# Patient Record
Sex: MALE | URBAN - METROPOLITAN AREA
[De-identification: names, ages, dates, MRNs, and addresses within clinical notes are randomized per-mention and may not be internally consistent; named-entity substitution may affect disease eponyms.]

---

## 2024-10-07 ENCOUNTER — TELEPHONE (OUTPATIENT)
Age: 39
End: 2024-10-07

## 2024-10-07 NOTE — TELEPHONE ENCOUNTER
Pt called in to start the process of being seen by Dr. Priest. Pt had back surgery in the past at [facility] done by Dr. George and would like to establish with him at [facility].   Pt is going to reach out to [facility] to have all records from [facility] and PCP faxed in to the office.   Pt is having similar back pain as before.   I explained our intake process. Pt was thankful for the information and will call in to do intake to then be seen by an AP as no recent imaging has been done.

## 2024-10-28 ENCOUNTER — TELEPHONE (OUTPATIENT)
Dept: NEUROSURGERY | Facility: CLINIC | Age: 39
End: 2024-10-28

## 2024-10-28 NOTE — TELEPHONE ENCOUNTER
Received 1 cd by certified mail today  from [facility] all the disc reads is 24 studies exam dates 08/8/2014- 7/21/2020 I see pt would be new and saw documentation from EP that he is new sent her message that disc was received gave to linn to upload  -_KIA       10/29/24 - hi received about 100 pages of records  double sided also for this pt in mail today records are from [facility] scanning to [facility] now EP advised in secure chat _- 190 pages scanned in unable to send all at once had to send in two batches to [facility] - EP was advised BA

## 2024-11-14 ENCOUNTER — OFFICE VISIT (OUTPATIENT)
Age: 39
End: 2024-11-14
Payer: COMMERCIAL

## 2024-11-14 ENCOUNTER — HOSPITAL ENCOUNTER (OUTPATIENT)
Dept: RADIOLOGY | Facility: HOSPITAL | Age: 39
End: 2024-11-14
Payer: COMMERCIAL

## 2024-11-14 VITALS
WEIGHT: 168 LBS | RESPIRATION RATE: 20 BRPM | HEIGHT: 71 IN | OXYGEN SATURATION: 94 % | TEMPERATURE: 98.2 F | HEART RATE: 82 BPM | BODY MASS INDEX: 23.52 KG/M2 | DIASTOLIC BLOOD PRESSURE: 70 MMHG | SYSTOLIC BLOOD PRESSURE: 108 MMHG

## 2024-11-14 DIAGNOSIS — M54.16 LUMBAR RADICULOPATHY: Primary | ICD-10-CM

## 2024-11-14 DIAGNOSIS — M54.42 ACUTE MIDLINE LOW BACK PAIN WITH BILATERAL SCIATICA: ICD-10-CM

## 2024-11-14 DIAGNOSIS — M54.16 LUMBAR RADICULOPATHY: ICD-10-CM

## 2024-11-14 DIAGNOSIS — M54.41 ACUTE MIDLINE LOW BACK PAIN WITH BILATERAL SCIATICA: ICD-10-CM

## 2024-11-14 DIAGNOSIS — M51.26 OTHER INTERVERTEBRAL DISC DISPLACEMENT, LUMBAR REGION: ICD-10-CM

## 2024-11-14 PROCEDURE — 99203 OFFICE O/P NEW LOW 30 MIN: CPT | Performed by: PHYSICIAN ASSISTANT

## 2024-11-14 PROCEDURE — 72114 X-RAY EXAM L-S SPINE BENDING: CPT

## 2024-11-14 NOTE — PROGRESS NOTES
Name: Wellington Hollis      : 1985      MRN: [id number]  Encounter Provider: Beverly Flanagan PA-C  Encounter Date: 2024   Encounter department: [facility]  :  Assessment & Plan  Lumbar radiculopathy  Patient presents today as a new patient for evaluation of low back pain and posterior leg pain.  Patient endorses mid/low back pain with radiation into gluteal and posterior lateral thigh with associated tingling.  At times, feels as though legs are going to give out.   Activities limited 2/2 pain    Imaging:   No updated imaging   MRI lumbar spine 2018:   1. Small broad-based posterior central disc protrusion at L4-5 with mild narrowing of both lateral recesses.   2. Small broad-based posterior central disc protrusion at L2-3 with minimal central canal narrowing.     Plan:   Continue to monitor neurological symptoms  Discussed options for conservative management including medications, physical therapy and pain management.  Referral placed for formal physical therapy.  In the interim patient to continue his home stretching regimen.  Referral placed for pain management for further discussion of injections.  Ordered flexion/extension Xrays given patient endorsing feeling of shift in his back with movement.   MRI spine imaging ordered to evaluate any progression of known lumbar degenerative changes.  Patient wishes to follow-up with Dr. Priest after completion of MRI imaging and trial of conservative management.    Of note. Dr. Priest completed prior ACDF  and 2018 at [facility].     Orders:    MRI lumbar spine without contrast; Future    XR spine lumbar complete w bending minimum 6 views; Future    Ambulatory Referral to Physical Therapy; Future    Ambulatory referral to Sp[facility] Management; Future    Acute midline low back pain with bilateral sciatica    Orders:    MRI lumbar spine without contrast; Future    XR spine lumbar complete w bending minimum 6  "views; Future    Ambulatory Referral to Physical Therapy; Future    Ambulatory referral to Spine & Pain Management; Future    Other intervertebral disc displacement, lumbar region    Orders:    MRI lumbar spine without contrast; Future    XR spine lumbar complete w bending minimum 6 views; Future    Ambulatory Referral to Physical Therapy; Future    Ambulatory referral to Spine & Pain Management; Future        History of Present Illness   This is a 38 y/o male with PMH ACDF C5/6, C6/7 by Dr. Priest 2016 and 2018 who presents today as a new patient who presents today for evaluation of low back pain and radiculopathy.  Patient describes pain between the shoulder blades which is longstanding since his cervical surgery generally controlled with stretching and home exercise regimen as well as chiropractics.  Patient endorses mid to low back \"popping\" and a sensation of stretching and settling of vertebral bodies with position changes. He stopped going to the chiropractic since this sensation began.  He endorses low back pain with radiation to his posteriolateral bilateral gluteal and thighs.  There is associated tinging in the similar distribution which does not generally travel below the knee.  Increased symptoms with ambulation and while walking up steps he feels as though his legs are going to give out at times. This morning, he describes a cough causing a sharp pain and locking up his legs. He has trialed ice, heat, stretched, ibuprofen and marijuana (helps with pain and sleeping).     Patient endorses back problems in 2018 at which time Xrays and MRI was completed with mild degenerative changes.  He had LILIANE with resolution of his symptoms for a period of time.     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  Unfortunately, he has been let go as he has not be able to complete the job with his symptoms.         History obtained from: patient  Review of Systems   Constitutional: " "Negative.    HENT: Negative.     Eyes: Negative.    Respiratory: Negative.     Cardiovascular: Negative.    Gastrointestinal: Negative.    Endocrine: Negative.    Genitourinary: Negative.    Musculoskeletal:  Positive for back pain (upper back down to lowwr back down the legs).   Neurological:  Positive for weakness (in the legs giving out sometimes) and numbness (sometimes tingiling in the legs specially in the morning).   Psychiatric/Behavioral:  Negative for suicidal ideas.      Past Medical History   Past Medical History:   Diagnosis Date    Cervical disc disorder 2016    Degenerative Disc disease    Low back pain 2016    Pain    Lumbosacral disc disease 2018    Pain    Thoracic disc disorder 2018    Pain     History reviewed. No pertinent surgical history.  Family History   Problem Relation Age of Onset    Stroke Mother         Stroke which took her vidion in left eye. Possibly know cause, will discuss in person      reports that he has been smoking cigarettes. He started smoking about 20 years ago. He has a 30 pack-year smoking history. He has never used smokeless tobacco. He reports current alcohol use of about 3.0 standard drinks of alcohol per week. He reports current drug use. Frequency: 7.00 times per week. Drug: Marijuana.  No current outpatient medications on file prior to visit.     No current facility-administered medications on file prior to visit.   No Known Allergies        Objective   /70 (BP Location: Right arm, Patient Position: Sitting, Cuff Size: Large)   Pulse 82   Temp 98.2 °F (36.8 °C) (Temporal)   Resp 20   Ht 5' 11\" (1.803 m)   Wt 76.2 kg (168 lb)   SpO2 94%   BMI 23.43 kg/m²      Physical Exam  Constitutional:       General: He is not in acute distress.     Appearance: Normal appearance. He is well-developed. He is not ill-appearing.   HENT:      Head: Normocephalic and atraumatic.      Right Ear: External ear normal.      Left Ear: External ear normal.      Nose: Nose " normal.      Mouth/Throat:      Mouth: Mucous membranes are moist.   Eyes:      General: No scleral icterus.        Right eye: No discharge.         Left eye: No discharge.      Conjunctiva/sclera: Conjunctivae normal.   Cardiovascular:      Rate and Rhythm: Normal rate.   Pulmonary:      Effort: Pulmonary effort is normal. No respiratory distress.   Musculoskeletal:         General: Tenderness (diffuse thoracic and lumbar spine) present.      Cervical back: Normal range of motion and neck supple.   Skin:     General: Skin is warm and dry.   Neurological:      Mental Status: He is alert.   Psychiatric:         Mood and Affect: Mood normal.         Speech: Speech normal.         Behavior: Behavior normal.         Thought Content: Thought content normal.         Judgment: Judgment normal.       Neurologic Exam     Mental Status   Follows 3 step commands.   Attention: normal. Concentration: normal.   Speech: speech is normal   Level of consciousness: alert  Knowledge: good.   Normal comprehension.     Cranial Nerves     CN III, IV, VI   Conjugate gaze: present    CN VII   Facial expression full, symmetric.     CN VIII   Hearing: intact    Motor Exam   Muscle bulk: normal  Overall muscle tone: normal    Strength   Strength 5/5 except as noted. Mild HF weakness 4+ with some pain inhibition  +SLR     Sensory Exam   Light touch normal.     Gait, Coordination, and Reflexes     Tremor   Resting tremor: absent  Intention tremor: absent  Action tremor: absent    Reflexes   Right Kim: absent  Left Kim: absentSubtle 2 beat clonus b/l        Administrative Statements   I have spent a total time of 48 minutes in caring for this patient on the day of the visit/encounter including Risks and benefits of tx options, Instructions for management, Importance of tx compliance, Impressions, Documenting in the medical record, Reviewing / ordering tests, medicine, procedures  , and Obtaining or reviewing history  .

## 2024-11-19 ENCOUNTER — EVALUATION (OUTPATIENT)
Age: 39
End: 2024-11-19
Payer: COMMERCIAL

## 2024-11-19 ENCOUNTER — TELEPHONE (OUTPATIENT)
Dept: NEUROSURGERY | Facility: CLINIC | Age: 39
End: 2024-11-19

## 2024-11-19 DIAGNOSIS — M54.41 ACUTE MIDLINE LOW BACK PAIN WITH BILATERAL SCIATICA: Primary | ICD-10-CM

## 2024-11-19 DIAGNOSIS — M54.16 LUMBAR RADICULOPATHY: ICD-10-CM

## 2024-11-19 DIAGNOSIS — M51.26 OTHER INTERVERTEBRAL DISC DISPLACEMENT, LUMBAR REGION: ICD-10-CM

## 2024-11-19 DIAGNOSIS — M54.42 ACUTE MIDLINE LOW BACK PAIN WITH BILATERAL SCIATICA: Primary | ICD-10-CM

## 2024-11-19 PROCEDURE — 97110 THERAPEUTIC EXERCISES: CPT | Performed by: PHYSICAL THERAPIST

## 2024-11-19 NOTE — PROGRESS NOTES
PT Evaluation     Today's date: 2024  Patient name: Wellington Hollis  : 1985  MRN: [id number]  Referring provider: Beverly Flanagan PA-C  Dx:   Encounter Diagnosis     ICD-10-CM    1. Lumbar radiculopathy  M54.16 Ambulatory Referral to Physical Therapy      2. Acute midline low back pain with bilateral sciatica  M54.42 Ambulatory Referral to Physical Therapy    M54.41       3. Other intervertebral disc displacement, lumbar region  M51.26 Ambulatory Referral to Physical Therapy                     Assessment  Impairments: abnormal coordination, abnormal muscle firing, abnormal or restricted ROM, abnormal movement, activity intolerance, impaired balance, impaired physical strength, lacks appropriate home exercise program, pain with function, poor posture  and poor body mechanics  Functional limitations: walking, standing, lifting,  Symptom irritability: moderate    Assessment details: Wellington Hollis is a 39 y.o. male presenting with chronic LBP with underlying centrally mediated pain. Primary impairments include decreased ROM, weakness, pain, endruance, postural tolerance . Will benefit from skilled PT interventions for Return to PLOF. HEP was provided and reviewed. Patient is able to complete HEP with good technique and appropriate pain response. Patient expressed understanding of appropriate dosage and frequency of HEP. No additional referral necessary.     Understanding of Dx/Px/POC: good     Prognosis: good    Goals    Short Term Goals:  In 4 weeks, the patient will:  1. Full pain free lumbar flexion  2. Standing toelracne to >60mins  3. Supervision with HEP for self care    Long Term Goals:  In 8 weeks, the patient will:  1. SLR to >75  2. FOTO to greater than predicted value  3. Independent with HEP for selfcare    Plan  Patient would benefit from: skilled physical therapy    Planned therapy interventions: joint mobilization, manual therapy, massage, Peguero taping, neuromuscular re-education,  patient education, postural training, self care, strengthening, stretching, therapeutic activities, therapeutic exercise, therapeutic training, graded exercise, graded activity, home exercise program, flexibility, functional ROM exercises, balance and activity modification    Frequency: 2x week  Duration in weeks: 8  Treatment plan discussed with: patient    Subjective  Pain Location: C/S Fusion C6-7, current c/o lumbar pain without radicular s/s,   Pain Intensity: 8/10  MARTHA: chronic, worse in last month,   Provoked by: prolonged, bending, lifting, prolonged walking, time dependent,   Eased Positions: positional changes  Constitutional S/S: denies  Goals: return to PLOF,   PLOF: independent activity,     Objective    Red Flags:None    Postural Findings:     Head Position x Protracted  Neutral  Retracted   Scapular Position x Protracted  Neutral  Retracted   Thoracic Spine x Inc Kyphosis  Neutral     Lumbar Spine  Inc Lordosis  Neutral x Dec Lordosis   Pelvis  Anterior Tilt  Neutral x Posterior Tilt   Iliac Crest  L elevated x Neutral  R elevated   Feet  Pronated x Neutral  Supinated   Lateral Shift  Right  Left x None     Strength and ROM evaluated B from a regional biomechanical perspective and values relevant to this episode recorded in tables below.    ROM:   Joint / Motion  Right  Left    Lumbar Flexion  60p     Lumbar Extension  0     Lumbar Sidebending  50% 50%   Lumbar rotation 50% 50%   Hip ER  WNL WNL   Hip IR  WNL WNL     Repeated Movements: Not indicated    NEURO Screen  Reflexes  Right Left   Biceps (C5-C6) 2+ 2+   Brachioradialis (C5-C6) 2+ 2+   Triceps (C7-C8) 2+ 2+   Patellar (L3-L4) 2+ 2+   Achilles (S1-S2) 2+ 2+   Kim's n n   Clonus n n     LE Myotomes:  Nerve root Test Action RIGHT  LEFT   L1-L2 Hip Flexion 5 5   L3 Knee Extension 5 5   L4  Ankle DF 5 5   L5 Great toe Extension 5 5   S1 Ankle PF, ankle eversion, hip extension, knee flexion 5   5     S2 Knee Flexion 5 5     Dermatomes:  intact    Additional Assessments:  Pain with palpation noted: generalized hyperalgesia, TTP paraspinales L5-S1  Joint Mobility: guarded, pain with Pas L4-S1    Special Tests:  Lumbar Specific and Neural Tension                                                                            Test / Measure  Right 11/19/2024 Left 11/19/2024   Straight Leg raise 50 60   Crossed straight leg raise n n   Slump test p p   Prone instability test n n     SI Joint Screen: No Presence of Inominate asymmetry, (P) March Test, TTP posterior SIJ ligaments         Precautions: C3-7 fusion (2018),     Visit/Unit Tracking  AUTH Status:  Date 11/20     pending Used 1      Remaining          Pertinent Findings:      POC End Date: 1/19/25                                                                                          Test / Measure  11/20   FOTO (Predicted 67) 53   Lumbar flexion ROM 60p   Core mmt 3/5         Treatment Based Classification: Primary Mobility Secondary CBT    Manuals 11/20                   Neuro Re-Ed                                Ther Ex    HEP reveiw 8'                               Ther Activity            Gait Training            Modalities

## 2024-11-20 PROCEDURE — 97162 PT EVAL MOD COMPLEX 30 MIN: CPT | Performed by: PHYSICAL THERAPIST

## 2024-12-03 ENCOUNTER — OFFICE VISIT (OUTPATIENT)
Age: 39
End: 2024-12-03
Payer: COMMERCIAL

## 2024-12-03 DIAGNOSIS — M54.16 LUMBAR RADICULOPATHY: Primary | ICD-10-CM

## 2024-12-03 DIAGNOSIS — M51.26 OTHER INTERVERTEBRAL DISC DISPLACEMENT, LUMBAR REGION: ICD-10-CM

## 2024-12-03 DIAGNOSIS — M54.41 ACUTE MIDLINE LOW BACK PAIN WITH BILATERAL SCIATICA: ICD-10-CM

## 2024-12-03 DIAGNOSIS — M54.42 ACUTE MIDLINE LOW BACK PAIN WITH BILATERAL SCIATICA: ICD-10-CM

## 2024-12-03 PROCEDURE — 97112 NEUROMUSCULAR REEDUCATION: CPT | Performed by: PHYSICAL THERAPIST

## 2024-12-03 PROCEDURE — 97110 THERAPEUTIC EXERCISES: CPT | Performed by: PHYSICAL THERAPIST

## 2024-12-03 NOTE — PROGRESS NOTES
Daily Note     Today's date: 12/3/2024  Patient name: Wellington Hollis  : 1985  MRN: [id number]  Referring provider: Beverly Flanagan PA-C  Dx:   Encounter Diagnosis     ICD-10-CM    1. Lumbar radiculopathy  M54.16       2. Acute midline low back pain with bilateral sciatica  M54.42     M54.41       3. Other intervertebral disc displacement, lumbar region  M51.26           Start Time: 0950  Stop Time: 1030  Total time in clinic (min): 40 minutes    Subjective: Minimal change in status due to travels for family issues.     Objective: See treatment diary below    Assessment: Tolerated treatment well. Patient demonstrated fatigue post treatment, exhibited good technique with therapeutic exercises, and would benefit from continued PT 1:1 with Jimy Mcnair DPT for entirety of tx. Improve ROM and decreased guarding post HVLA. Continue per POC.     Plan: Continue per plan of care.      Precautions: C3-7 fusion (),     Visit/Unit Tracking  AUTH Status:  Date 11/20 12/3    pending Used 1 2     Remaining          Pertinent Findings:      POC End Date: 25                                                                                          Test / Measure     FOTO (Predicted 67) 53   Lumbar flexion ROM 60p   Core mmt 3/5         Treatment Based Classification: Primary Mobility Secondary CBT    Manuals 11/20 12/3   Neutral Gap G5  L5-S1 (B) dec s/s                    Neuro Re-Ed     LTRs  2x30   Sesar     Bridges  2x20   Ball DKTC  x30   Seated [place] ROT  3# x20 ea   Seated herbert RDL  2x15 5#        Ther Ex     HEP reveiw 8'    NS   8' L1                                 Ther Activity               Gait Training               Modalities

## 2024-12-06 ENCOUNTER — OFFICE VISIT (OUTPATIENT)
Age: 39
End: 2024-12-06
Payer: COMMERCIAL

## 2024-12-06 DIAGNOSIS — M54.41 ACUTE MIDLINE LOW BACK PAIN WITH BILATERAL SCIATICA: ICD-10-CM

## 2024-12-06 DIAGNOSIS — M54.42 ACUTE MIDLINE LOW BACK PAIN WITH BILATERAL SCIATICA: ICD-10-CM

## 2024-12-06 DIAGNOSIS — M51.26 OTHER INTERVERTEBRAL DISC DISPLACEMENT, LUMBAR REGION: ICD-10-CM

## 2024-12-06 DIAGNOSIS — M54.16 LUMBAR RADICULOPATHY: Primary | ICD-10-CM

## 2024-12-06 PROCEDURE — 97110 THERAPEUTIC EXERCISES: CPT

## 2024-12-06 PROCEDURE — 97112 NEUROMUSCULAR REEDUCATION: CPT

## 2024-12-06 NOTE — PROGRESS NOTES
Daily Note     Today's date: 2024  Patient name: Wellington Hollis  : 1985  MRN: [id number]  Referring provider: Beverly Flanagan PA-C  Dx:   Encounter Diagnosis     ICD-10-CM    1. Lumbar radiculopathy  M54.16       2. Acute midline low back pain with bilateral sciatica  M54.42     M54.41       3. Other intervertebral disc displacement, lumbar region  M51.26                      Subjective: Pt reports that he is feeling sore today in his low back. Notes that he has been doing his HEP and the prone back bends cause increased pain in his low back.     Objective: See treatment diary below    Assessment: Tolerated treatment well. Patient demonstrated fatigue post treatment, exhibited good technique with therapeutic exercises, and would benefit from continued PT 1:1 with Prosper Borjas PTA for entirety of tx. Improve ROM and decreased guarding post HVLA. Continue per POC.     Plan: Continue per plan of care.      Precautions: C3-7 fusion (2018),     Visit/Unit Tracking  AUTH Status:  Date 11/20 12/3 12/6   pending Used 1 2 3    Remaining          Pertinent Findings:      POC End Date: 25                                                                                          Test / Measure     FOTO (Predicted 67) 53   Lumbar flexion ROM 60p   Core mmt 3/5         Treatment Based Classification: Primary Mobility Secondary CBT    Manuals 11/20 12/3 12/6   Neutral Gap G5  L5-S1 (B) dec s/s                        Neuro Re-Ed      LTRs  2x30 2x30   Sesar   Propped 4'   Bridges  2x20 2x20   Ball DKTC  x30 x30   Seated [place] ROT  3# x20 ea 3# x20 ea   Seated herbert RDL  2x15 5# 2x15 5#         Ther Ex      HEP reveiw 8'     NS   8' L1 8' L1                                       Ther Activity                  Gait Training                  Modalities

## 2024-12-10 ENCOUNTER — OFFICE VISIT (OUTPATIENT)
Age: 39
End: 2024-12-10
Payer: COMMERCIAL

## 2024-12-10 DIAGNOSIS — M51.26 OTHER INTERVERTEBRAL DISC DISPLACEMENT, LUMBAR REGION: ICD-10-CM

## 2024-12-10 DIAGNOSIS — M54.41 ACUTE MIDLINE LOW BACK PAIN WITH BILATERAL SCIATICA: ICD-10-CM

## 2024-12-10 DIAGNOSIS — M54.42 ACUTE MIDLINE LOW BACK PAIN WITH BILATERAL SCIATICA: ICD-10-CM

## 2024-12-10 DIAGNOSIS — M54.16 LUMBAR RADICULOPATHY: Primary | ICD-10-CM

## 2024-12-10 PROCEDURE — 97110 THERAPEUTIC EXERCISES: CPT

## 2024-12-10 PROCEDURE — 97112 NEUROMUSCULAR REEDUCATION: CPT

## 2024-12-10 NOTE — PROGRESS NOTES
Daily Note     Today's date: 12/10/2024  Patient name: Wellington Hollis  : 1985  MRN: [id number]  Referring provider: Beverly Flanagan PA-C  Dx:   Encounter Diagnosis     ICD-10-CM    1. Lumbar radiculopathy  M54.16       2. Acute midline low back pain with bilateral sciatica  M54.42     M54.41       3. Other intervertebral disc displacement, lumbar region  M51.26                      Subjective: Pt reports he is doing well overall, but he is a little more stiff today.       Objective: See treatment diary below      Assessment: Tolerated treatment well. Pt performed all exercises without issue, continue to progress to tolerance. Pt showed good form with exercises throughout session, minimal cueing required. Small # progression with jimmie exercises without issue. Patient demonstrated fatigue post treatment, exhibited good technique with therapeutic exercises, and would benefit from continued PT      Plan: Continue per plan of care.      Precautions: C3-7 fusion (2018),     Visit/Unit Tracking  AUTH Status:  Date 11/20 12/3 12/6 12/10   pending Used 1 2 3 4    Remaining           Pertinent Findings:      POC End Date: 25                                                                                          Test / Measure     FOTO (Predicted 67) 53   Lumbar flexion ROM 60p   Core mmt 3/5         Treatment Based Classification: Primary Mobility Secondary CBT    Manuals 11/20 12/3 12/6 12/10   Neutral Gap G5  L5-S1 (B) dec s/s                            Neuro Re-Ed       LTRs  2x30 2x30 2x30   Sesar   Propped 4' Propped 4'   Bridges  2x20 2x20 2x20   Ball DKTC  x30 x30 x30   Seated Jimmie ROT  3# x20 ea 3# x20 ea 4# x20 ea   Seated jimmie RDL  2x15 5# 2x15 5# 2x15 6.5#          Ther Ex       HEP reveiw 8'      NS   8' L1 8' L1 8' L1                                             Ther Activity                     Gait Training                     Modalities

## 2024-12-12 ENCOUNTER — OFFICE VISIT (OUTPATIENT)
Age: 39
End: 2024-12-12
Payer: COMMERCIAL

## 2024-12-12 DIAGNOSIS — M54.16 LUMBAR RADICULOPATHY: Primary | ICD-10-CM

## 2024-12-12 DIAGNOSIS — M54.41 ACUTE MIDLINE LOW BACK PAIN WITH BILATERAL SCIATICA: ICD-10-CM

## 2024-12-12 DIAGNOSIS — M54.42 ACUTE MIDLINE LOW BACK PAIN WITH BILATERAL SCIATICA: ICD-10-CM

## 2024-12-12 PROCEDURE — 97140 MANUAL THERAPY 1/> REGIONS: CPT | Performed by: PHYSICAL THERAPIST

## 2024-12-12 PROCEDURE — 97110 THERAPEUTIC EXERCISES: CPT | Performed by: PHYSICAL THERAPIST

## 2024-12-12 PROCEDURE — 97112 NEUROMUSCULAR REEDUCATION: CPT | Performed by: PHYSICAL THERAPIST

## 2024-12-12 NOTE — PROGRESS NOTES
Daily Note     Today's date: 2024  Patient name: Wellington Hollis  : 1985  MRN: [id number]  Referring provider: Beverly Flanagan PA-C  Dx:   Encounter Diagnosis     ICD-10-CM    1. Lumbar radiculopathy  M54.16       2. Acute midline low back pain with bilateral sciatica  M54.42     M54.41           Start Time: 0840  Stop Time: 0935  Total time in clinic (min): 55 minutes    Subjective: Improved mobility still having pain.     Objective: See treatment diary below    Assessment: Tolerated treatment well. Patient demonstrated fatigue post treatment, exhibited good technique with therapeutic exercises, and would benefit from continued PT 1:1 with Jimy Mcnair DPT for entirety of tx. Pending MRI, improved activity tolerance. Continue per POC    Plan: Continue per plan of care.      Precautions: C3-7 fusion (),     Visit/Unit Tracking  AUTH Status:  Date 11/20 12/3 12/6 12/10 12/12   pending Used 1 2 3 4 5F    Remaining            Pertinent Findings:      POC End Date: 25                                                                                          Test / Measure     FOTO (Predicted 67) 53   Lumbar flexion ROM 60p   Core mmt 3/5         Treatment Based Classification: Primary Mobility Secondary CBT    Manuals 11/20 12/3 12/6 12/10 12/12   Neutral Gap G5  L5-S1 (B) dec s/s   L5-S1 (B) dec s/s KS                             Neuro Re-Ed        LTRs  2x30 2x30 2x30 2x30   Sesar   Propped 4' Propped 4' Propped 4'   Bridges  2x20 2x20 2x20 2x20 10#   Ball DKTC  x30 x30 x30 x30   Seated [place] ROT  3# x20 ea 3# x20 ea 4# x20 ea 4# x20 ea   Seated herbert RDL  2x15 5# 2x15 5# 2x15 6.5# 2x15 6.5#           Ther Ex        HEP reveiw 8'       NS   8' L1 8' L1 8' L1 8' L1                                                   Ther Activity                        Gait Training                        Modalities

## 2024-12-17 ENCOUNTER — OFFICE VISIT (OUTPATIENT)
Age: 39
End: 2024-12-17
Payer: COMMERCIAL

## 2024-12-17 DIAGNOSIS — M54.16 LUMBAR RADICULOPATHY: Primary | ICD-10-CM

## 2024-12-17 DIAGNOSIS — M54.42 ACUTE MIDLINE LOW BACK PAIN WITH BILATERAL SCIATICA: ICD-10-CM

## 2024-12-17 DIAGNOSIS — M51.26 OTHER INTERVERTEBRAL DISC DISPLACEMENT, LUMBAR REGION: ICD-10-CM

## 2024-12-17 DIAGNOSIS — M54.41 ACUTE MIDLINE LOW BACK PAIN WITH BILATERAL SCIATICA: ICD-10-CM

## 2024-12-17 PROCEDURE — 97110 THERAPEUTIC EXERCISES: CPT | Performed by: PHYSICAL THERAPIST

## 2024-12-17 PROCEDURE — 97140 MANUAL THERAPY 1/> REGIONS: CPT | Performed by: PHYSICAL THERAPIST

## 2024-12-17 PROCEDURE — 97112 NEUROMUSCULAR REEDUCATION: CPT | Performed by: PHYSICAL THERAPIST

## 2024-12-17 NOTE — PROGRESS NOTES
Daily Note     Today's date: 2024  Patient name: Wellington Hollis  : 1985  MRN: [id number]  Referring provider: Beverly Flanagan PA-C  Dx:   Encounter Diagnosis     ICD-10-CM    1. Lumbar radiculopathy  M54.16       2. Other intervertebral disc displacement, lumbar region  M51.26       3. Acute midline low back pain with bilateral sciatica  M54.42     M54.41           Start Time: 0835  Stop Time: 0930  Total time in clinic (min): 55 minutes    Subjective: Increased pain over the weekend.     Objective: See treatment diary below    Assessment: Tolerated treatment well. Patient demonstrated fatigue post treatment, exhibited good technique with therapeutic exercises, and would benefit from continued PT 1:1 with Jimy Mcnair DPT for entirety of tx.     Plan: Continue per plan of care.      Precautions: C3-7 fusion (),     Visit/Unit Tracking  AUTH Status:  Date 11/20 12/3 12/6 12/10 12/12 12/17   pending Used 1 2 3 4 5F 6    Remaining             Pertinent Findings:      POC End Date: 25                                                                                          Test / Measure     FOTO (Predicted 67) 53   Lumbar flexion ROM 60p   Core mmt 3/5         Treatment Based Classification: Primary Mobility Secondary CBT    Manuals 11/20 12/3 12/6 12/10 12/12 12/17   Neutral Gap G5  L5-S1 (B) dec s/s   L5-S1 (B) dec s/s [place] L5-S1 (B) dec s/s [place]                                Neuro Re-Ed         LTRs  2x30 2x30 2x30 2x30 2x30   Sesar   Propped 4' Propped 4' Propped 4' Propped 4'   Bridges  2x20 2x20 2x20 2x20 10# 2x20 20#   Ball DKTC  x30 x30 x30 x30 x30   Seated [place] ROT  3# x20 ea 3# x20 ea 4# x20 ea 4# x20 ea 4# x20 ea   Seated herbert RDL  2x15 5# 2x15 5# 2x15 6.5# 2x15 6.5# 2x15 6.5#   [place] Press      X15 ea blk band   Front Carry      3x20ft 20#            Ther Ex         HEP reveiw 8'        NS   8' L1 8' L1 8' L1 8' L1 8' L1                                                          Ther Activity                           Gait Training                           Modalities

## 2024-12-19 ENCOUNTER — OFFICE VISIT (OUTPATIENT)
Age: 39
End: 2024-12-19
Payer: COMMERCIAL

## 2024-12-19 DIAGNOSIS — M54.42 ACUTE MIDLINE LOW BACK PAIN WITH BILATERAL SCIATICA: ICD-10-CM

## 2024-12-19 DIAGNOSIS — M51.26 OTHER INTERVERTEBRAL DISC DISPLACEMENT, LUMBAR REGION: ICD-10-CM

## 2024-12-19 DIAGNOSIS — M54.16 LUMBAR RADICULOPATHY: Primary | ICD-10-CM

## 2024-12-19 DIAGNOSIS — M54.41 ACUTE MIDLINE LOW BACK PAIN WITH BILATERAL SCIATICA: ICD-10-CM

## 2024-12-19 PROCEDURE — 97140 MANUAL THERAPY 1/> REGIONS: CPT | Performed by: PHYSICAL THERAPIST

## 2024-12-19 PROCEDURE — 97110 THERAPEUTIC EXERCISES: CPT | Performed by: PHYSICAL THERAPIST

## 2024-12-19 NOTE — PROGRESS NOTES
Daily Note     Today's date: 2024  Patient name: Wellington Hollis  : 1985  MRN: [id number]  Referring provider: Beverly Flanagan PA-C  Dx:   Encounter Diagnosis     ICD-10-CM    1. Lumbar radiculopathy  M54.16       2. Acute midline low back pain with bilateral sciatica  M54.42     M54.41       3. Other intervertebral disc displacement, lumbar region  M51.26           Start Time: 0830  Stop Time: 0915  Total time in clinic (min): 45 minutes    Subjective: Pt reports feeling much better today compared to last visit post G5.     Objective: See treatment diary below    Assessment: Tolerated treatment well. Patient demonstrated fatigue post treatment, exhibited good technique with therapeutic exercises, and would benefit from continued PT 1:1 with Jimy Mcnair DPT for 30 mins of tx.     Plan: Continue per plan of care.      Precautions: C3-7 fusion (),     Visit/Unit Tracking  AUTH Status:  Date 11/20 12/3 12/6 12/10 12/12 12/17 12/9   pending Used 1 2 3 4 5F 6 7    Remaining              Pertinent Findings:      POC End Date: 25                                                                                          Test / Measure     FOTO (Predicted 67) 53   Lumbar flexion ROM 60p   Core mmt 3/5         Treatment Based Classification: Primary Mobility Secondary CBT    Manuals 11/20 12/3 12/6 12/10 12/12 12/17 12/19   Neutral Gap G5  L5-S1 (B) dec s/s   L5-S1 (B) dec s/s KS L5-S1 (B) dec s/s KS L5-S1 (B) dec s/s KS                                   Neuro Re-Ed          LTRs  2x30 2x30 2x30 2x30 2x30 2x30   Sesar   Propped 4' Propped 4' Propped 4' Propped 4' Propped 4'   Bridges  2x20 2x20 2x20 2x20 10# 2x20 20# 2x20 20#   Ball DKTC  x30 x30 x30 x30 x30 x30   Seated [place] ROT  3# x20 ea 3# x20 ea 4# x20 ea 4# x20 ea 4# x20 ea 4# x20 ea   Seated her[place] RDL  2x15 5# 2x15 5# 2x15 6.5# 2x15 6.5# 2x15 6.5# 2x15 6.5#   Paloff Press      X15 ea blk band    Front Carry      3x20ft 20# 4x20ft 20#              Ther Ex          HEP reveiw 8'         NS   8' L1 8' L1 8' L1 8' L1 8' L1 8'L1                                                               Ther Activity                              Gait Training                              Modalities

## 2024-12-20 ENCOUNTER — HOSPITAL ENCOUNTER (OUTPATIENT)
Dept: RADIOLOGY | Age: 39
Discharge: HOME | End: 2024-12-20
Payer: COMMERCIAL

## 2024-12-20 DIAGNOSIS — M54.16 LUMBAR RADICULOPATHY: ICD-10-CM

## 2024-12-20 DIAGNOSIS — M54.42 ACUTE MIDLINE LOW BACK PAIN WITH BILATERAL SCIATICA: ICD-10-CM

## 2024-12-20 DIAGNOSIS — M54.41 ACUTE MIDLINE LOW BACK PAIN WITH BILATERAL SCIATICA: ICD-10-CM

## 2024-12-20 DIAGNOSIS — M51.26 OTHER INTERVERTEBRAL DISC DISPLACEMENT, LUMBAR REGION: ICD-10-CM

## 2024-12-20 PROCEDURE — 72148 MRI LUMBAR SPINE W/O DYE: CPT

## 2024-12-23 ENCOUNTER — OFFICE VISIT (OUTPATIENT)
Age: 39
End: 2024-12-23
Payer: COMMERCIAL

## 2024-12-23 DIAGNOSIS — M54.41 ACUTE MIDLINE LOW BACK PAIN WITH BILATERAL SCIATICA: ICD-10-CM

## 2024-12-23 DIAGNOSIS — M54.42 ACUTE MIDLINE LOW BACK PAIN WITH BILATERAL SCIATICA: ICD-10-CM

## 2024-12-23 DIAGNOSIS — M54.16 LUMBAR RADICULOPATHY: Primary | ICD-10-CM

## 2024-12-23 DIAGNOSIS — M51.26 OTHER INTERVERTEBRAL DISC DISPLACEMENT, LUMBAR REGION: ICD-10-CM

## 2024-12-23 PROCEDURE — 97110 THERAPEUTIC EXERCISES: CPT

## 2024-12-23 PROCEDURE — 97112 NEUROMUSCULAR REEDUCATION: CPT

## 2024-12-23 NOTE — PROGRESS NOTES
Daily Note     Today's date: 2024  Patient name: Wellington Hollis  : 1985  MRN: [id number]  Referring provider: Beverly Flanagan PA-C  Dx:   Encounter Diagnosis     ICD-10-CM    1. Lumbar radiculopathy  M54.16       2. Acute midline low back pain with bilateral sciatica  M54.42     M54.41       3. Other intervertebral disc displacement, lumbar region  M51.26             Start Time: 0900  Stop Time: 0945  Total time in clinic (min): 45 minutes    Subjective: Pt reports his pain levels are about the same, slight improvement; however it feels better after movement. He has been complaint with his HEP. Reports will see his neuro about MRI results end Dec.     Objective: See treatment diary below    Assessment: Tolerated treatment well. Patient performed NuStep to increase blood flow to the area being treated, prepare the muscle for strength training and lengthening and to improve overall tolerance to activity  Added femoral n glides in prone and PPU per PT POC to address impairments with good tolerance. Pt c/o of some pain/tolerable during PPU and pain during femoral glide; however faired better with decrease range.  Patient demonstrated fatigue post treatment, exhibited good technique with therapeutic exercises, and would benefit from continued PT     Plan: Continue per plan of care.      Precautions: C3-7 fusion (2018),     Visit/Unit Tracking  AUTH Status:  Date 11/20 12/3 12/6 12/10 12/12 12/17 12/9 12/23   pending Used 1 2 3 4 5F 6 7 8    Remaining               Pertinent Findings:      POC End Date: 25                                                                                          Test / Measure     FOTO (Predicted 67) 53   Lumbar flexion ROM 60p   Core mmt 3/5         Treatment Based Classification: Primary Mobility Secondary CBT    Manuals 11/20 12/3 12/6 12/10 12/12 12/17 12/19 12/23   Neutral Gap G5  L5-S1 (B) dec s/s   L5-S1 (B) dec s/s KS L5-S1 (B) dec s/s KS L5-S1 (B) dec s/s  KS                                       Neuro Re-Ed           LTRs  2x30 2x30 2x30 2x30 2x30 2x30 2x30   Sesar   Propped 4' Propped 4' Propped 4' Propped 4' Propped 4' 2x10       Bridges  2x20 2x20 2x20 2x20 10# 2x20 20# 2x20 20# 2x20 20#   Ball DKTC  x30 x30 x30 x30 x30 x30 x30   Seated [place] ROT  3# x20 ea 3# x20 ea 4# x20 ea 4# x20 ea 4# x20 ea 4# x20 ea 4# x20 ea   Seated jimmie RDL  2x15 5# 2x15 5# 2x15 6.5# 2x15 6.5# 2x15 6.5# 2x15 6.5# 2x15 6.5#   Paloff Press      X15 ea blk band     Front Carry      3x20ft 20# 4x20ft 20# 4x20ft 20#   Femoral nerve glide        Prone  W strap    x15       Ther Ex           HEP reveiw 8'          NS   8' L1 8' L1 8' L1 8' L1 8' L1 8'L1 8' L1                                                                     Ther Activity                                 Gait Training                                 Modalities

## 2024-12-24 ENCOUNTER — RESULTS FOLLOW-UP (OUTPATIENT)
Dept: OTHER | Facility: HOSPITAL | Age: 39
End: 2024-12-24

## 2024-12-26 ENCOUNTER — OFFICE VISIT (OUTPATIENT)
Age: 39
End: 2024-12-26
Payer: COMMERCIAL

## 2024-12-26 DIAGNOSIS — M51.26 OTHER INTERVERTEBRAL DISC DISPLACEMENT, LUMBAR REGION: ICD-10-CM

## 2024-12-26 DIAGNOSIS — M54.16 LUMBAR RADICULOPATHY: Primary | ICD-10-CM

## 2024-12-26 DIAGNOSIS — M54.41 ACUTE MIDLINE LOW BACK PAIN WITH BILATERAL SCIATICA: ICD-10-CM

## 2024-12-26 DIAGNOSIS — M54.42 ACUTE MIDLINE LOW BACK PAIN WITH BILATERAL SCIATICA: ICD-10-CM

## 2024-12-26 PROCEDURE — 97112 NEUROMUSCULAR REEDUCATION: CPT | Performed by: PHYSICAL THERAPIST

## 2024-12-26 PROCEDURE — 97110 THERAPEUTIC EXERCISES: CPT | Performed by: PHYSICAL THERAPIST

## 2024-12-26 PROCEDURE — 97140 MANUAL THERAPY 1/> REGIONS: CPT | Performed by: PHYSICAL THERAPIST

## 2024-12-26 NOTE — PROGRESS NOTES
Daily Note     Today's date: 2024  Patient name: Wellington Hollis  : 1985  MRN: [id number]  Referring provider: Beverly Flanagan PA-C  Dx:   Encounter Diagnosis     ICD-10-CM    1. Lumbar radiculopathy  M54.16       2. Acute midline low back pain with bilateral sciatica  M54.42     M54.41       3. Other intervertebral disc displacement, lumbar region  M51.26           Start Time: 0930  Stop Time: 1023  Total time in clinic (min): 53 minutes    Subjective: Had MRI, pending review with MD .    Objective: See treatment diary below    Assessment: Tolerated treatment well. Patient demonstrated fatigue post treatment, exhibited good technique with therapeutic exercises, and would benefit from continued PT. 1:1 with Jimy Mcnair DPT for entirety of tx. Reviewed concepts of PNE and hyperalgesia with pt. Progressed graded exposure. Continue per POC.    Plan: Continue per plan of care.      Precautions: C3-7 fusion (),     Visit/Unit Tracking  AUTH Status:  Date 11/20 12/3 12/6 12/10 12/12 12/17 12/9 12/23 12/26   pending Used 1 2 3 4 5F 6 7 8 9    Remaining                Pertinent Findings:      POC End Date: 25                                                                                          Test / Measure     FOTO (Predicted 67) 53   Lumbar flexion ROM 60p   Core mmt 3/5         Treatment Based Classification: Primary Mobility Secondary CBT    Manuals 11/20 12/3 12/6 12/10 12/12 12/17 12/19 12/23 12/26   Neutral Gap G5  L5-S1 (B) dec s/s   L5-S1 (B) dec s/s KS L5-S1 (B) dec s/s KS L5-S1 (B) dec s/s KS  L5-S1 (B) dec s/s KS                                         Neuro Re-Ed            LTRs  2x30 2x30 2x30 2x30 2x30 2x30 2x30 2x30   Sesar   Propped 4' Propped 4' Propped 4' Propped 4' Propped 4' 2x10        Bridges  2x20 2x20 2x20 2x20 10# 2x20 20# 2x20 20# 2x20 20# 2x20 20#   Ball DKTC  x30 x30 x30 x30 x30 x30 x30 x30   Seated [place] ROT  3# x20 ea 3# x20 ea 4# x20 ea 4# x20 ea 4# x20  ea 4# x20 ea 4# x20 ea 4.2# x20 ea   Seated [place] RDL  2x15 5# 2x15 5# 2x15 6.5# 2x15 6.5# 2x15 6.5# 2x15 6.5# 2x15 6.5# 2x15 15#   Paloff Press      X15 ea blk band      Front Carry      3x20ft 20# 4x20ft 20# 4x20ft 20# 4x20ft 20#   Femoral nerve glide        Prone  W strap    x15     Prone  W strap    x15   Ther Ex            HEP reveiw 8'           NS   8' L1 8' L1 8' L1 8' L1 8' L1 8'L1 8' L1 8' L1   PNE         7'                                                               Ther Activity                                    Gait Training                                    Modalities

## 2024-12-30 ENCOUNTER — OFFICE VISIT (OUTPATIENT)
Age: 39
End: 2024-12-30
Payer: COMMERCIAL

## 2024-12-30 DIAGNOSIS — M51.26 OTHER INTERVERTEBRAL DISC DISPLACEMENT, LUMBAR REGION: ICD-10-CM

## 2024-12-30 DIAGNOSIS — M54.16 LUMBAR RADICULOPATHY: Primary | ICD-10-CM

## 2024-12-30 DIAGNOSIS — M54.41 ACUTE MIDLINE LOW BACK PAIN WITH BILATERAL SCIATICA: ICD-10-CM

## 2024-12-30 DIAGNOSIS — M54.42 ACUTE MIDLINE LOW BACK PAIN WITH BILATERAL SCIATICA: ICD-10-CM

## 2024-12-30 PROCEDURE — 97110 THERAPEUTIC EXERCISES: CPT | Performed by: PHYSICAL THERAPIST

## 2024-12-30 PROCEDURE — 97112 NEUROMUSCULAR REEDUCATION: CPT | Performed by: PHYSICAL THERAPIST

## 2024-12-30 NOTE — PROGRESS NOTES
Daily Note     Today's date: 2024  Patient name: Wellintgon Hollis  : 1985  MRN: [id number]  Referring provider: Beverly Flanagan PA-C  Dx:   Encounter Diagnosis     ICD-10-CM    1. Lumbar radiculopathy  M54.16       2. Acute midline low back pain with bilateral sciatica  M54.42     M54.41       3. Other intervertebral disc displacement, lumbar region  M51.26           Start Time: 1330          Subjective: Reports continued general improvement in s/s.     Objective: See treatment diary below    Assessment: Tolerated treatment well. Patient demonstrated fatigue post treatment, exhibited good technique with therapeutic exercises, and would benefit from continued PT 1:1 with Jimy Mcnair DPT for entirety of tx. Progressing well, reduced pain and tolerated increased intensity well.  Introduced mod range compound lift with rack pull, tolerated well.     Plan: Continue per plan of care.      Precautions: C3-7 fusion (),     Visit/Unit Tracking  AUTH Status:  Date 11/20 12/3 12/6 12/10 12/12 12/17 12/9 12/23 12/26 12/30   12v Used 1 2 3 4 5F 6 7 8 9 10    Remaining                 Pertinent Findings:      POC End Date: 25                                                                                          Test / Measure     FOTO (Predicted 67) 53   Lumbar flexion ROM 60p   Core mmt 3/5         Treatment Based Classification: Primary Mobility Secondary CBT    Manuals 12/10 12/12 12/17 12/19 12/23 12/26 12/30   Neutral Gap G5  L5-S1 (B) dec s/s KS L5-S1 (B) dec s/s KS L5-S1 (B) dec s/s KS  L5-S1 (B) dec s/s KS L5-S1 (B) dec s/s KS                                   Neuro Re-Ed          LTRs 2x30 2x30 2x30 2x30 2x30 2x30 2x30   Sesar Propped 4' Propped 4' Propped 4' Propped 4' 2x10         Bridge on ball       2x15   Bridges 2x20 2x20 10# 2x20 20# 2x20 20# 2x20 20# 2x20 20# 2x20 20#   Ball DKTC x30 x30 x30 x30 x30 x30    Seated [place] ROT 4# x20 ea 4# x20 ea 4# x20 ea 4# x20 ea 4# x20 ea 4.2#  x20 ea 4.2# x20 ea   Seated [place] RDL 2x15 6.5# 2x15 6.5# 2x15 6.5# 2x15 6.5# 2x15 6.5# 2x15 15# 2x15 15#   Paloff Press   X15 ea blk band       Front Carry   3x20ft 20# 4x20ft 20# 4x20ft 20# 4x20ft 20# 4x20ft 20#   Femoral nerve glide     Prone  W strap    x15     Prone  W strap    x15    Rack pull       20# kb 3x5    Ther Ex          HEP reveiw          NS  8' L1 8' L1 8' L1 8'L1 8' L1 8' L1 8' L1   PNE      7'                                                      Ther Activity                              Gait Training                              Modalities

## 2024-12-31 ENCOUNTER — OFFICE VISIT (OUTPATIENT)
Dept: NEUROSURGERY | Facility: CLINIC | Age: 39
End: 2024-12-31
Payer: COMMERCIAL

## 2024-12-31 ENCOUNTER — APPOINTMENT (OUTPATIENT)
Age: 39
End: 2024-12-31
Payer: COMMERCIAL

## 2024-12-31 VITALS
DIASTOLIC BLOOD PRESSURE: 78 MMHG | BODY MASS INDEX: 23.52 KG/M2 | HEART RATE: 83 BPM | WEIGHT: 168 LBS | RESPIRATION RATE: 18 BRPM | HEIGHT: 71 IN | OXYGEN SATURATION: 98 % | SYSTOLIC BLOOD PRESSURE: 122 MMHG | TEMPERATURE: 98.2 F

## 2024-12-31 DIAGNOSIS — M54.41 ACUTE MIDLINE LOW BACK PAIN WITH BILATERAL SCIATICA: Primary | ICD-10-CM

## 2024-12-31 DIAGNOSIS — M54.42 ACUTE MIDLINE LOW BACK PAIN WITH BILATERAL SCIATICA: Primary | ICD-10-CM

## 2024-12-31 PROCEDURE — 99214 OFFICE O/P EST MOD 30 MIN: CPT | Performed by: SPECIALIST

## 2024-12-31 NOTE — PROGRESS NOTES
Name: Wellington Hollis      : 1985      MRN: [id number]  Encounter Provider: Taylor Priest MD  Encounter Date: 2024   Encounter department: [facility]  :  Assessment & Plan    This is a pleasant 39-year-old gentleman that I knew from [facility].  I did ACDF surgery x 2 on him a few years back.  He has done very well from that perspective.  Unfortunately he has gone on to develop low back pain.  He states it has been going on for some time now, but physical therapy has worked.  He has not had any recent epidural injections, but he did have an epidural injection in his lumbar spine in the past.  He says that it is worked for over 2 years.  Unfortunately his pain has recurred and he is here to have further discussions about the prospects of surgery.  His MRI study shows 2 herniated disc, 1 at L2-3 and the other at L4-5.  The one at L2-3 seems to be eccentric to the right and the 1 at L4-5 is more centrally based.  We discussed the prospects of surgery versus nonsurgical treatment options.  The patient is understandably not very interested in pursuing right to surgery.  Instead, judging by the fact that he did improve with epidural injections in the past, he would prefer to go down this route.  In fact, he was asking to see Dr. Pretty.  I informed him that Dr. Pretty works at [facility] now as well.  A referral was already placed for him to see pain management a few months ago when he saw our advanced practitioner.  At this point he feels comfortable calling to set up an appointment for them after speaking to me.  He knows he is always free to return to the office here should nonsurgical treatment options fail.  It was a pleasure seeing him today after all of these years.  I answered all of his questions.    Ravi Priest MD    History of Present Illness   HPI  This is a pleasant 39-year-old gentleman that I knew from [facility].  I did ACDF surgery x 2 on  "him a few years back.  He has done very well from that perspective.  Unfortunately he has gone on to develop low back pain.  He states it has been going on for some time now, but physical therapy has worked.  He has not had any recent epidural injections, but he did have an epidural injection in his lumbar spine in the past.  He says that it is worked for over 2 years.  Unfortunately his pain has recurred and he is here to have further discussions about the prospects of surgery.  His MRI study shows 2 herniated disc, 1 at L2-3 and the other at L4-5.  The one at L2-3 seems to be eccentric to the right and the 1 at L4-5 is more centrally based.  We discussed the prospects of surgery versus nonsurgical treatment options.        Past Medical History:   Diagnosis Date    Cervical disc disorder 2016    Degenerative Disc disease    Low back pain 2016    Pain    Lumbosacral disc disease 2018    Pain    Thoracic disc disorder 2018    Pain       History reviewed. No pertinent surgical history.    No current outpatient medications on file.   Review of Systems   Musculoskeletal:  Positive for back pain (lbp across the back into b/l buttocks and thighs). Negative for gait problem and myalgias.   Neurological:  Negative for weakness and numbness (tingling b/l hips and buttocks).   Hematological:  Does not bruise/bleed easily.   All other systems reviewed and are negative.   I have personally reviewed the MA's review of systems and made changes as necessary.         Objective   Resp 18   Ht 5' 11\" (1.803 m)   Wt 76.2 kg (168 lb)   BMI 23.43 kg/m²     Physical Exam  Neurological Exam      Alert, oriented, fluent  No facial droop  Good tracking eye movements  5/5 strength in all extremities  No numbness  Gait slightly antalgic  Healed ACDF scar in anterior neck.         "

## 2025-01-02 ENCOUNTER — OFFICE VISIT (OUTPATIENT)
Age: 40
End: 2025-01-02
Payer: COMMERCIAL

## 2025-01-02 DIAGNOSIS — M54.41 ACUTE MIDLINE LOW BACK PAIN WITH BILATERAL SCIATICA: ICD-10-CM

## 2025-01-02 DIAGNOSIS — M54.42 ACUTE MIDLINE LOW BACK PAIN WITH BILATERAL SCIATICA: ICD-10-CM

## 2025-01-02 DIAGNOSIS — M51.26 OTHER INTERVERTEBRAL DISC DISPLACEMENT, LUMBAR REGION: ICD-10-CM

## 2025-01-02 DIAGNOSIS — M54.16 LUMBAR RADICULOPATHY: Primary | ICD-10-CM

## 2025-01-02 PROCEDURE — 97112 NEUROMUSCULAR REEDUCATION: CPT | Performed by: PHYSICAL THERAPIST

## 2025-01-02 PROCEDURE — 97110 THERAPEUTIC EXERCISES: CPT | Performed by: PHYSICAL THERAPIST

## 2025-01-02 NOTE — PROGRESS NOTES
Daily Note     Today's date: 2025  Patient name: Wellington Hollis  : 1985  MRN: [id number]  Referring provider: Beverly Flanagan PA-C  Dx:   Encounter Diagnosis     ICD-10-CM    1. Lumbar radiculopathy  M54.16       2. Acute midline low back pain with bilateral sciatica  M54.42     M54.41       3. Other intervertebral disc displacement, lumbar region  M51.26           Start Time: 0800  Stop Time: 0845  Total time in clinic (min): 45 minutes    Subjective: Pt reports increased s/s this week.     Objective: See treatment diary below    Assessment: Tolerated treatment well. Patient demonstrated fatigue post treatment, exhibited good technique with therapeutic exercises, and would benefit from continued PT 1:1 with Jimy Mcnair DPT for entirety of tx. Reduced intensity today due to pt having increased pain overnight.     Plan: Continue per plan of care.      Precautions: C3-7 fusion (),     Visit/Unit Tracking  AUTH Status:  Date 11/20 12/3 12/6 12/10 12/12 12/17 12/9 12/23 12/26 12/30 1/2   12v Used 1 2 3 4 5F 6 7 8 9 10 11    Remaining                  Pertinent Findings:      POC End Date: 25                                                                                          Test / Measure     FOTO (Predicted 67) 53   Lumbar flexion ROM 60p   Core mmt 3/5         Treatment Based Classification: Primary Mobility Secondary CBT    Manuals 12/10 12/12 12/17 12/19 12/23 12/26 12/30 1/2   Neutral Gap G5  L5-S1 (B) dec s/s KS L5-S1 (B) dec s/s KS L5-S1 (B) dec s/s KS  L5-S1 (B) dec s/s KS L5-S1 (B) dec s/s KS L5-S1 (B) dec s/s KS                                      Neuro Re-Ed           LTRs 2x30 2x30 2x30 2x30 2x30 2x30 2x30 2x30   Sesar Propped 4' Propped 4' Propped 4' Propped 4' 2x10          Bridge on ball       2x15 2x15   Bridges 2x20 2x20 10# 2x20 20# 2x20 20# 2x20 20# 2x20 20# 2x20 20# 2x20   Ball DKTC x30 x30 x30 x30 x30 x30     Seated [place] ROT 4# x20 ea 4# x20 ea 4# x20 ea 4#  x20 ea 4# x20 ea 4.2# x20 ea 4.2# x20 ea 4.2# x20 ea   Seated [place] RDL 2x15 6.5# 2x15 6.5# 2x15 6.5# 2x15 6.5# 2x15 6.5# 2x15 15# 2x15 15# 2x15 15#   Paloff Press   X15 ea blk band        Front Carry   3x20ft 20# 4x20ft 20# 4x20ft 20# 4x20ft 20# 4x20ft 20# 4x20ft 20#   Femoral nerve glide     Prone  W strap    x15     Prone  W strap    x15     Rack pull       20# kb 3x5  20# kb 3x5    Ther Ex           HEP reveiw           NS  8' L1 8' L1 8' L1 8'L1 8' L1 8' L1 8' L1 8' L1   PNE      7'                                                            Ther Activity                                 Gait Training                                 Modalities

## 2025-01-07 ENCOUNTER — EVALUATION (OUTPATIENT)
Age: 40
End: 2025-01-07
Payer: COMMERCIAL

## 2025-01-07 DIAGNOSIS — M54.41 ACUTE MIDLINE LOW BACK PAIN WITH BILATERAL SCIATICA: ICD-10-CM

## 2025-01-07 DIAGNOSIS — M54.42 ACUTE MIDLINE LOW BACK PAIN WITH BILATERAL SCIATICA: ICD-10-CM

## 2025-01-07 DIAGNOSIS — M51.26 OTHER INTERVERTEBRAL DISC DISPLACEMENT, LUMBAR REGION: ICD-10-CM

## 2025-01-07 DIAGNOSIS — M54.16 LUMBAR RADICULOPATHY: Primary | ICD-10-CM

## 2025-01-07 PROCEDURE — 97110 THERAPEUTIC EXERCISES: CPT | Performed by: PHYSICAL THERAPIST

## 2025-01-07 PROCEDURE — 97112 NEUROMUSCULAR REEDUCATION: CPT | Performed by: PHYSICAL THERAPIST

## 2025-01-07 NOTE — PROGRESS NOTES
Daily Note / Reassessment     Today's date: 2025  Patient name: Wellington Hollis  : 1985  MRN: [id number]  Referring provider: Beverly Flanagan PA-C  Dx:   Encounter Diagnosis     ICD-10-CM    1. Lumbar radiculopathy  M54.16       2. Acute midline low back pain with bilateral sciatica  M54.42     M54.41       3. Other intervertebral disc displacement, lumbar region  M51.26           Start Time: 1000  Stop Time: 1100  Total time in clinic (min): 60 minutes    Subjective:  Pt reports being about 50% back to the desired level of function at this time. Pt still has difficulty lifting, playing with son, and performing heavy activities around the house. Discussed spinal injection at last physician follow-up which will be scheduled in the coming weeks.  Reiterated his desire to avoid surgery at this time.  Feels he is making progress with therapy.    Objective: See treatment diary below    Assessment: Wellington Hollis has been seen for 12 sessions of PT related to chronic LBP with . They have improved with: Lumbar ROM, symptom centralization, and positional tolerance. They remain limited with: Lumbar extension, thoracic mobility, spinal stabilizer activation, and LLTT. Current FOTO outcome measure score is 52, compared to 53 at IE. They would benefit from continued course of PT to meet all established goals and facilitate return to PLOF.       Plan: Continue per plan of care.      Precautions: C3-7 fusion (2018),     Visit/Unit Tracking  AUTH Status:  Date    12v Used 11 12 (RE)    Remaining         Pertinent Findings:      POC End Date: 25                                                                                          Test / Measure     FOTO (Predicted 67) 53 52   Lumbar flexion ROM 60p 75%   Lumbar Ext  25%   Thoracic Ext  Poor   Core mmt 3/5 4-/5   Repeated motions  Hinge at L3   LLTT   (L) (+) Tibial bias           Treatment Based Classification: Primary Mobility Secondary  "CBT    Manuals 1/7   Neutral Gap G5 NT   Lumbar PA     Thoracic pistol   T9 and T6 HVLAT         Neuro Re-Ed    LTRs NT   REIL 2x10   Bridge on ball NT   Bridges 2x20   S/L TrA Brace 20x5\"   Seated Jim[place] ROT NT   Seated jimmie RDL 2x15 15#   Paloff Press 8# 10x5\" (B)   Front Carry NT   Sciatic nerve glide 2x10   Rack pull NT   Ther Ex    HEP reveiw    NS  8' L1   PNE    TS Ext foam roll 3x10                   Ther Activity            Gait Training            Modalities                               "

## 2025-01-09 ENCOUNTER — OFFICE VISIT (OUTPATIENT)
Age: 40
End: 2025-01-09
Payer: COMMERCIAL

## 2025-01-09 DIAGNOSIS — M54.42 ACUTE MIDLINE LOW BACK PAIN WITH BILATERAL SCIATICA: ICD-10-CM

## 2025-01-09 DIAGNOSIS — M54.41 ACUTE MIDLINE LOW BACK PAIN WITH BILATERAL SCIATICA: ICD-10-CM

## 2025-01-09 DIAGNOSIS — M54.16 LUMBAR RADICULOPATHY: Primary | ICD-10-CM

## 2025-01-09 DIAGNOSIS — M51.26 OTHER INTERVERTEBRAL DISC DISPLACEMENT, LUMBAR REGION: ICD-10-CM

## 2025-01-09 PROCEDURE — 97112 NEUROMUSCULAR REEDUCATION: CPT

## 2025-01-09 PROCEDURE — 97110 THERAPEUTIC EXERCISES: CPT

## 2025-01-09 NOTE — PROGRESS NOTES
"Daily Note     Today's date: 2025  Patient name: Wellington Hollis  : 1985  MRN: [id number]  Referring provider: Beverly Flanagan PA-C  Dx:   Encounter Diagnosis     ICD-10-CM    1. Lumbar radiculopathy  M54.16       2. Acute midline low back pain with bilateral sciatica  M54.42     M54.41       3. Other intervertebral disc displacement, lumbar region  M51.26                      Subjective: Maybe a little less stiff/soreness in back of leg compared to previous session. Pain only in back. Notes having more mobility than when he started therapy.      Objective: See treatment diary below      Assessment: Tolerated treatment well. Patient demonstrates good from with exercises. Able to progress increased weight during Paloff press, no complaints of pain though fatigued. Noted improvement with front carry with less forward bending and no significant backward lean to compensate for weight. Patient would benefit from continued PT.      Plan: Continue per plan of care.  Progress treatment as tolerated.       Precautions: C3-7 fusion (),     Visit/Unit Tracking  AUTH Status:  Date    12v Used 11 12 (RE) 13    Remaining          Pertinent Findings:      POC End Date: 25                                                                                          Test / Measure     FOTO (Predicted 67) 53 52   Lumbar flexion ROM 60p 75%   Lumbar Ext  25%   Thoracic Ext  Poor   Core mmt 3/5 4-/5   Repeated motions  Hinge at L3   LLTT   (L) (+) Tibial bias           Treatment Based Classification: Primary Mobility Secondary CBT    Manuals    Neutral Gap G5 NT    Lumbar PA      Thoracic pistol   T9 and T6 HVLAT           Neuro Re-Ed     LTRs NT 10x3\"   REIL 2x10 3x10 belt OP   Bridge on ball NT 3x30\"    Bridges 2x20    S/L TrA Brace 20x5\" 20x5\"   Seated Jimmie ROT NT    Seated jimmie RDL 2x15 15#    Paloff Press 8# 10x5\" (B) 10# 2x10x5\" B   Front Carry NT 20# 3 laps   Sciatic nerve glide " 2x10 2x10 B   Rack pull NT    Ther Ex     HEP reveiw     NS  8' L1 8' L1   PNE     TS Ext foam roll 3x10                        Ther Activity               Gait Training               Modalities

## 2025-01-13 ENCOUNTER — OFFICE VISIT (OUTPATIENT)
Age: 40
End: 2025-01-13
Payer: COMMERCIAL

## 2025-01-13 DIAGNOSIS — M54.16 LUMBAR RADICULOPATHY: Primary | ICD-10-CM

## 2025-01-13 DIAGNOSIS — M51.26 OTHER INTERVERTEBRAL DISC DISPLACEMENT, LUMBAR REGION: ICD-10-CM

## 2025-01-13 DIAGNOSIS — M54.41 ACUTE MIDLINE LOW BACK PAIN WITH BILATERAL SCIATICA: ICD-10-CM

## 2025-01-13 DIAGNOSIS — M54.42 ACUTE MIDLINE LOW BACK PAIN WITH BILATERAL SCIATICA: ICD-10-CM

## 2025-01-13 PROCEDURE — 97110 THERAPEUTIC EXERCISES: CPT | Performed by: PHYSICAL THERAPIST

## 2025-01-13 PROCEDURE — 97112 NEUROMUSCULAR REEDUCATION: CPT | Performed by: PHYSICAL THERAPIST

## 2025-01-13 NOTE — PROGRESS NOTES
"Daily Note     Today's date: 2025  Patient name: Wellington Hollis  : 1985  MRN: [id number]  Referring provider: Beverly Flanagan PA-C  Dx:   Encounter Diagnosis     ICD-10-CM    1. Lumbar radiculopathy  M54.16       2. Acute midline low back pain with bilateral sciatica  M54.42     M54.41       3. Other intervertebral disc displacement, lumbar region  M51.26           Start Time: 1100  Stop Time: 1150  Total time in clinic (min): 50 minutes    Subjective: Pain only in back today.  Felt better after last session.      Objective: See treatment diary below      Assessment: Tolerated treatment well. Patient demonstrates good from with exercises. Able to progress increased weight during Paloff press, no complaints of pain though fatigued. Noted improvement with front carry with less forward bending and no significant backward lean to compensate for weight. Patient would benefit from continued PT.      Plan: Continue per plan of care.  Progress treatment as tolerated.       Precautions: C3-7 fusion (),     Visit/Unit Tracking  AUTH Status:  Date    12v Used 11 12 (RE) 13 14    Remaining           Pertinent Findings:      POC End Date: 25                                                                                          Test / Measure     FOTO (Predicted 67) 53 52   Lumbar flexion ROM 60p 75%   Lumbar Ext  25%   Thoracic Ext  Poor   Core mmt 3/5 4-/5   Repeated motions  Hinge at L3   LLTT   (L) (+) Tibial bias           Treatment Based Classification: Primary Mobility Secondary CBT    Manuals     Neutral Gap G5 NT      Lumbar PA        Thoracic pistol      T9 and T6 HVLAT T9 and T6 HVLAT           Neuro Re-Ed       LTRs NT 10x3\" 10x3\"    REIL 2x10 3x10 belt OP 3x10 belt OP    Bridge on ball NT 3x30\"  3x30\"     Bridges 2x20      S/L TrA Brace 20x5\" 20x5\" 20x5\"    Seated [place] ROT NT      Seated herbert RDL 2x15 15#      Paloff Press 8# 10x5\" (B) 10# 2x10x5\" B " "10# 2x10 5\" B    Front Carry NT 20# 3 laps 20# 3 laps    Sciatic nerve glide 2x10 2x10 B 2x10 B    Rack pull NT      Ther Ex       HEP reveiw       NS  8' L1 8' L1 8' L1    PNE       TS Ext foam roll 3x10                                  Ther Activity                     Gait Training                     Modalities                                          "

## 2025-01-16 ENCOUNTER — OFFICE VISIT (OUTPATIENT)
Age: 40
End: 2025-01-16
Payer: COMMERCIAL

## 2025-01-16 DIAGNOSIS — M54.16 LUMBAR RADICULOPATHY: Primary | ICD-10-CM

## 2025-01-16 DIAGNOSIS — M51.26 OTHER INTERVERTEBRAL DISC DISPLACEMENT, LUMBAR REGION: ICD-10-CM

## 2025-01-16 DIAGNOSIS — M54.41 ACUTE MIDLINE LOW BACK PAIN WITH BILATERAL SCIATICA: ICD-10-CM

## 2025-01-16 DIAGNOSIS — M54.42 ACUTE MIDLINE LOW BACK PAIN WITH BILATERAL SCIATICA: ICD-10-CM

## 2025-01-16 PROCEDURE — 97110 THERAPEUTIC EXERCISES: CPT

## 2025-01-16 PROCEDURE — 97112 NEUROMUSCULAR REEDUCATION: CPT

## 2025-01-16 NOTE — PROGRESS NOTES
"Daily Note     Today's date: 2025  Patient name: eWllington Hollis  : 1985  MRN: [id number]  Referring provider: Beverly Flanagan PA-C  Dx:   Encounter Diagnosis     ICD-10-CM    1. Lumbar radiculopathy  M54.16       2. Acute midline low back pain with bilateral sciatica  M54.42     M54.41       3. Other intervertebral disc displacement, lumbar region  M51.26                      Subjective: Symptoms are about the same. Increased pain just in low back with periods of prolonged activity or standing. Uses farm show as an example and \"wanted to cry all the way home\" from normal walking.       Objective: See treatment diary below      Assessment: Tolerated treatment well. Addition of anti-rotation walking exercise with appropriate level of challenge. Good form with exercises. Patient would benefit from continued PT.      Plan: Continue per plan of care.  Progress treatment as tolerated.       Precautions: C3-7 fusion (),     Visit/Unit Tracking  AUTH Status:  Date    12v Used 11 12 (RE) 13 14    Remaining           Pertinent Findings:      POC End Date: 25                                                                                          Test / Measure     FOTO (Predicted 67) 53 52   Lumbar flexion ROM 60p 75%   Lumbar Ext  25%   Thoracic Ext  Poor   Core mmt 3/5 4-/5   Repeated motions  Hinge at L3   LLTT   (L) (+) Tibial bias           Treatment Based Classification: Primary Mobility Secondary CBT    Manuals    Neutral Gap G5 NT      Lumbar PA        Thoracic pistol      T9 and T6 HVLAT T9 and T6 HVLAT T9 and T6 HVLAT          Neuro Re-Ed       LTRs NT 10x3\" 10x3\" 10x3\"    REIL 2x10 3x10 belt OP 3x10 belt OP 3x10 belt OP   Bridge on ball NT 3x30\"  3x30\"  3x30\"   Bridges 2x20      S/L TrA Brace 20x5\" 20x5\" 20x5\" 20x5\"   Seated Jimmie ROT NT      Seated jimmie RDL 2x15 15#      Paloff Press 8# 10x5\" (B) 10# 2x10x5\" B 10# 2x10 5\" B    Big stick walkout  "   8# 10x B   Front Carry NT 20# 3 laps 20# 3 laps    Sciatic nerve glide 2x10 2x10 B 2x10 B 2x10 B    Rack pull NT      Ther Ex       HEP reveiw       NS  8' L1 8' L1 8' L1 8' L1   PNE       TS Ext foam roll 3x10   3x10   Open book    10x B                        Ther Activity                     Gait Training                     Modalities

## 2025-01-17 ENCOUNTER — CONSULT (OUTPATIENT)
Dept: PAIN MEDICINE | Facility: CLINIC | Age: 40
End: 2025-01-17
Payer: COMMERCIAL

## 2025-01-17 VITALS — HEIGHT: 71 IN | BODY MASS INDEX: 23.52 KG/M2 | WEIGHT: 168 LBS

## 2025-01-17 DIAGNOSIS — M51.26 LUMBAR DISC DISPLACEMENT WITHOUT MYELOPATHY: ICD-10-CM

## 2025-01-17 DIAGNOSIS — M54.16 LUMBAR RADICULOPATHY: Primary | ICD-10-CM

## 2025-01-17 PROCEDURE — 99204 OFFICE O/P NEW MOD 45 MIN: CPT | Performed by: ANESTHESIOLOGY

## 2025-01-17 RX ORDER — ASPIRIN 81 MG/1
TABLET ORAL
COMMUNITY

## 2025-01-17 NOTE — PROGRESS NOTES
Assessment  1. Lumbar radiculopathy    2. Lumbar disc displacement without myelopathy      Patient presenting with chronic back and radicular leg pain for greater than 1 year, worsening over the past several months.    Pain is consistent with lumbar radicular pain accompanied by pain >7/10 on the pain scale with inability to participate in IADLs for >6 weeks. Patient has participated with physical therapy as well as home exercises and stretches.  Patient has tried numerous medication classes in the past with varying benefit- he wishes to avoid oral medications if possible.  Denies any bowel or bladder incontinence, saddle anesthesia.    In regards to the patient's  pathology, we discussed the various treatment options including physical therapy, medication management, activity modifications, interventional spine procedures.  Given that patient has not had any benefit with conservative treatments, I think patient would benefit from targeted interventional treatment modalities.    Independently reviewed and interpreted lumbar MRI - this shows a right paramedian disc herniation at L2-3 with severe right lateral recess stenosis and moderate central stenosis, as well as a disc herniation centrally at L4-5 with moderate central stenosis.    Plan:    Pain follows bilateral L5 dermatomes currently. Discussed and recommended bilateral L5 TF ESIs given chronicity of pain for >3 months, severe pain intensity (7+/10), and lack of significant improvement with PT/HEP ongoing for 6+ weeks in the past 6 months.    The procedures, its risks, and benefits were explained in detail to the patient. Risks include but are not limited to bleeding, infection, hematoma formation, abscess formation, weakness, headache, failure the pain to improve, nerve irritation or damage, and potential worsening of the pain.  The approach was demonstrated using models and literature was provided. The patient verbalized understanding and wished to proceed  with the procedure.     Follow-up 1 month after TF ESIs.    Reviewed PT, Neurosurgery office notes.    Reviewed hemoglobin A1c, renal function, CBC and/or PT/INR prior to discussing/offering interventional modalities.    [place] Prescription Drug Monitoring Program report was reviewed and was appropriate     My impressions and treatment recommendations were discussed in detail with the patient who verbalized understanding and had no further questions.  Discharge instructions were provided. I personally saw and examined the patient and I agree with the above discussed plan of care.    Orders Placed This Encounter   Procedures    FL spine and pain procedure     Standing Status:   Future     Expected Date:   1/17/2025     Expiration Date:   1/17/2029     Reason for Exam::   bilateral L5 TF LILIANE     Anticoagulant hold needed?:   no     New Medications Ordered This Visit   Medications    raNITIdine HCl (ZANTAC 75 PO)    aspirin (ECOTRIN LOW STRENGTH) 81 mg EC tablet       History of Present Illness    Wellington Hollis is a 39 y.o. male presenting for consultation at [facility] for exam and evaluation of chronic back and radicular leg pain for greater than 1 year, worsening over the past several months. Pain started without any precipitating injury or trauma. Over the past month, the intensity of pain has been Moderate to severe. Pain is currently 7-8/10. Pain does interfere with age appropriate activities of daily living. Pain is nearly constant, with no typical pattern throughout the day. Pain is described as shooting, sharp, pressure-like with pins/needles. Patient endorses weakness in the lower extremities. Assistance device used: None.    Worsening factors noted: sitting, walking, exercise.   Improving factors noted: none noted.    Treatments tried:   PT: yes  Chiropractic therapy: no  Injections: yes, lumbar 1x and several cervical   Previous spine surgery: yes, ACDF    Anticoagulation:  no    Medications tried:   Tylenol, NSAIDs, lidocaine topical, muscle relaxants    I have personally reviewed and/or updated the patient's past medical history, past surgical history, family history, social history, current medications, allergies, and vital signs today.     Review of Systems   Constitutional:  Negative for chills and fever.   HENT:  Negative for ear pain and sore throat.    Eyes:  Negative for pain and visual disturbance.   Respiratory:  Negative for cough and shortness of breath.    Cardiovascular:  Negative for chest pain and palpitations.   Gastrointestinal:  Negative for abdominal pain and vomiting.   Genitourinary:  Negative for dysuria and hematuria.   Musculoskeletal:  Positive for arthralgias, back pain, gait problem and myalgias.   Skin:  Negative for color change and rash.   Neurological:  Positive for weakness. Negative for seizures and syncope.   All other systems reviewed and are negative.      There is no problem list on file for this patient.      Past Medical History:   Diagnosis Date    Cervical disc disorder 2016    Degenerative Disc disease    Low back pain 2016    Pain    Lumbosacral disc disease 2018    Pain    Thoracic disc disorder 2018    Pain       History reviewed. No pertinent surgical history.    Family History   Problem Relation Age of Onset    Stroke Mother         Stroke which took her vidion in left eye. Possibly know cause, will discuss in person       Social History     Occupational History    Not on file   Tobacco Use    Smoking status: Every Day     Current packs/day: 1.00     Average packs/day: 1.1 packs/day for 27.1 years (30.1 ttl pk-yrs)     Types: Cigarettes     Start date: 1/1/2004    Smokeless tobacco: Never    Tobacco comments:     Want to quit, but struggle with it. Did quit for a year in 2017   Substance and Sexual Activity    Alcohol use: Yes     Alcohol/week: 3.0 standard drinks of alcohol     Comment: Maybe a couple drinks a month, not much at all     "Drug use: Yes     Frequency: 7.0 times per week     Types: Marijuana     Comment: Only a little at night before bed.    Sexual activity: Yes     Partners: Female     Comment: Wife has an insert birth control       Current Outpatient Medications on File Prior to Visit   Medication Sig    aspirin (ECOTRIN LOW STRENGTH) 81 mg EC tablet  (Patient not taking: Reported on 1/17/2025)    raNITIdine HCl (ZANTAC 75 PO)  (Patient not taking: Reported on 1/17/2025)     No current facility-administered medications on file prior to visit.       Allergies   Allergen Reactions    Methocarbamol Other (See Comments)     \"MS symptoms\" muscle weakness, numbness, and tingling.       Physical Exam    Ht 5' 11\" (1.803 m)   Wt 76.2 kg (168 lb)   BMI 23.43 kg/m²     Constitutional: normal, well developed, well nourished, alert, in no distress and non-toxic and no overt pain behavior.  Eyes: anicteric  HEENT: grossly intact  Neck: supple, symmetric, trachea midline and no masses   Pulmonary:even and unlabored  Cardiovascular:No edema or pitting edema present  Skin:Normal without rashes or lesions and well hydrated  Psychiatric:Mood and affect appropriate  Neurologic: Motor function is grossly intact with no focal neurologic deficits   Musculoskeletal: nonantalgic gait, discomfort with lumbar spine ROM    Imaging  MRI LUMBAR SPINE WITHOUT CONTRAST     INDICATION: M54.16: Radiculopathy, lumbar region  M54.42: Lumbago with sciatica, left side  M54.41: Lumbago with sciatica, right side  M51.26: Other intervertebral disc displacement, lumbar region.   Acute midline low back pain with bilateral sciatica.     COMPARISON: Outside MRI March 25, 2018     TECHNIQUE:  Multiplanar, multisequence imaging of the lumbar spine was performed. .        IMAGE QUALITY:  Diagnostic     FINDINGS:     VERTEBRAL BODIES:  There are 5 lumbar type vertebral bodies.  Normal alignment of the lumbar spine.  No spondylolysis or spondylolisthesis. No scoliosis.  No " compression fracture.    Normal marrow signal is identified within the visualized bony   structures.  No discrete marrow lesion.     SACRUM:  Normal signal within the sacrum. No evidence of insufficiency or stress fracture.     DISTAL CORD AND CONUS:  Normal size and signal within the distal cord and conus.     PARASPINAL SOFT TISSUES:  Paraspinal soft tissues are unremarkable.     LOWER THORACIC DISC SPACES:  Normal disc height and signal.  No disc herniation, canal stenosis or foraminal narrowing.     LUMBAR DISC SPACES:     L1-L2:  Normal.     L2-L3: Mild diffuse annular bulge with a superimposed moderate-sized right paramedian protrusion type disc herniation contributes to moderate central stenosis and severe right lateral recess stenosis. Correlate for right L3 radiculopathy. Nerve roots   above this level are stretched and redundant secondary to the degree of spinal stenosis. Protrusion type disc herniation has increased in size with progressive central stenosis when compared to the prior study     L3-L4: Mild annular bulge with small marginal osteophytes and mild facet hypertrophy combined to result in mild central and mild left lateral recess stenosis. Minimal foraminal narrowing bilaterally.     L4-L5: Diffuse annular bulge with superimposed moderate size central protrusion type disc herniation contributes to moderate central and left lateral recess stenosis with mild right lateral recess stenosis. Marginal osteophytes contribute to mild   bilateral foraminal narrowing. Central stenosis has progressed from the prior study with slight interval increase size of central protrusion.     L5-S1: No significant central or foraminal narrowing with minimal anterolisthesis noted stable from prior study.     OTHER FINDINGS:  None.     IMPRESSION:     Progressive stenosis secondary to increased size of protrusion type disc herniations at L2-3 and L4-5 when compared to the prior study. Moderate central and left lateral  recess stenosis noted at L4-5 with moderate central and severe right lateral recess   stenosis at L2-3. Correlate for right L3 radiculopathy. Nerve roots above the L2-3 level are stretched and redundant secondary to the degree of spinal stenosis which is a new finding compared to the prior study.

## 2025-01-20 ENCOUNTER — OFFICE VISIT (OUTPATIENT)
Age: 40
End: 2025-01-20
Payer: COMMERCIAL

## 2025-01-20 DIAGNOSIS — M54.42 ACUTE MIDLINE LOW BACK PAIN WITH BILATERAL SCIATICA: ICD-10-CM

## 2025-01-20 DIAGNOSIS — M54.41 ACUTE MIDLINE LOW BACK PAIN WITH BILATERAL SCIATICA: ICD-10-CM

## 2025-01-20 DIAGNOSIS — M54.16 LUMBAR RADICULOPATHY: Primary | ICD-10-CM

## 2025-01-20 DIAGNOSIS — M51.26 OTHER INTERVERTEBRAL DISC DISPLACEMENT, LUMBAR REGION: ICD-10-CM

## 2025-01-20 PROCEDURE — 97110 THERAPEUTIC EXERCISES: CPT

## 2025-01-20 PROCEDURE — 97112 NEUROMUSCULAR REEDUCATION: CPT

## 2025-01-20 NOTE — PROGRESS NOTES
"Daily Note     Today's date: 2025  Patient name: Wellington Hollis  : 1985  MRN: [id number]  Referring provider: Beverly Flanagan PA-C  Dx:   Encounter Diagnosis     ICD-10-CM    1. Lumbar radiculopathy  M54.16       2. Acute midline low back pain with bilateral sciatica  M54.42     M54.41       3. Other intervertebral disc displacement, lumbar region  M51.26                      Subjective: Patient notes some increased soreness in low back today, says he tossed and turned all night last night and thinks he just slept funny. No pain in legs, just across low back.       Objective: See treatment diary below      Assessment: Tolerated treatment well. Patient with improvement in symptoms following EIL and lumbar/thoracic extension and rotation exercises. Able to increase resistance with anti-rotation. Patient would benefit from continued PT.      Plan: Continue per plan of care.  Progress treatment as tolerated.       Precautions: C3-7 fusion (),     Visit/Unit Tracking  AUTH Status:  Date    12v Used 11 12 (RE) 13 14 15 16    Remaining             Pertinent Findings:      POC End Date: 25                                                                                          Test / Measure     FOTO (Predicted 67) 53 52   Lumbar flexion ROM 60p 75%   Lumbar Ext  25%   Thoracic Ext  Poor   Core mmt 3/5 4-/5   Repeated motions  Hinge at L3   LLTT   (L) (+) Tibial bias           Treatment Based Classification: Primary Mobility Secondary CBT    Manuals    Neutral Gap G5 NT       Lumbar PA         Thoracic pistol      T9 and T6 HVLAT T9 and T6 HVLAT T9 and T6 HVLAT T9 and T6 HVLAT           Neuro Re-Ed        LTRs NT 10x3\" 10x3\" 10x3\"  10x3\"    REIL 2x10 3x10 belt OP 3x10 belt OP 3x10 belt OP 3x10 belt OP   Bridge on ball NT 3x30\"  3x30\"  3x30\" 3x30\"   Bridges 2x20       S/L TrA Brace 20x5\" 20x5\" 20x5\" 20x5\" 20x5\"    Seated [place] ROT NT     " "  Seated herbert RDL 2x15 15#       Paloff Press 8# 10x5\" (B) 10# 2x10x5\" B 10# 2x10 5\" B     Big stick walkout    8# 10x B 10# 8x B    Front Carry NT 20# 3 laps 20# 3 laps  20# 3 laps    Sciatic nerve glide 2x10 2x10 B 2x10 B 2x10 B  2x10 B    Rack pull NT       Ther Ex        HEP reveiw        NS  8' L1 8' L1 8' L1 8' L1 8' L1   PNE        TS Ext foam roll 3x10   3x10 3x10    Open book    10x B 10x B                           Ther Activity                        Gait Training                        Modalities                                             "

## 2025-01-23 ENCOUNTER — OFFICE VISIT (OUTPATIENT)
Age: 40
End: 2025-01-23
Payer: COMMERCIAL

## 2025-01-23 DIAGNOSIS — M54.42 ACUTE MIDLINE LOW BACK PAIN WITH BILATERAL SCIATICA: ICD-10-CM

## 2025-01-23 DIAGNOSIS — M54.16 LUMBAR RADICULOPATHY: Primary | ICD-10-CM

## 2025-01-23 DIAGNOSIS — M51.26 OTHER INTERVERTEBRAL DISC DISPLACEMENT, LUMBAR REGION: ICD-10-CM

## 2025-01-23 DIAGNOSIS — M54.41 ACUTE MIDLINE LOW BACK PAIN WITH BILATERAL SCIATICA: ICD-10-CM

## 2025-01-23 PROCEDURE — 97112 NEUROMUSCULAR REEDUCATION: CPT

## 2025-01-23 PROCEDURE — 97110 THERAPEUTIC EXERCISES: CPT

## 2025-01-23 NOTE — PROGRESS NOTES
"Daily Note     Today's date: 2025  Patient name: Wellington Hollis  : 1985  MRN: [id number]  Referring provider: Beverly Flanagan PA-C  Dx:   Encounter Diagnosis     ICD-10-CM    1. Lumbar radiculopathy  M54.16       2. Acute midline low back pain with bilateral sciatica  M54.42     M54.41       3. Other intervertebral disc displacement, lumbar region  M51.26                      Subjective: Pain is ~4/10 this morning, better than most morning. \"Feels like my hips are locked up this morning\".       Objective: See treatment diary below      Assessment: Tolerated treatment well. Noted cavitation with gr V lumbar gapping, improvements in feelings of stiffness following. Patient able to progress with core stability and LE strength with single leg bridge and addition of single leg RDL. Patient would benefit from continued PT.      Plan: Continue per plan of care.  Progress treatment as tolerated.       Precautions: C3-7 fusion (),     Visit/Unit Tracking  AUTH Status:  Date    12v Used 11 12 (RE) 13 14 15 16 17    Remaining              Pertinent Findings:      POC End Date: 25                                                                                          Test / Measure     FOTO (Predicted 67) 53 52   Lumbar flexion ROM 60p 75%   Lumbar Ext  25%   Thoracic Ext  Poor   Core mmt 3/5 4-/5   Repeated motions  Hinge at L3   LLTT   (L) (+) Tibial bias           Treatment Based Classification: Primary Mobility Secondary CBT    Manuals    Neutral Gap G5 NT     CS gr V B   Lumbar PA          Thoracic pistol      T9 and T6 HVLAT T9 and T6 HVLAT T9 and T6 HVLAT T9 and T6 HVLAT T9 and T6 HVLAT            Neuro Re-Ed         LTRs NT 10x3\" 10x3\" 10x3\"  10x3\"     REIL 2x10 3x10 belt OP 3x10 belt OP 3x10 belt OP 3x10 belt OP 3x10 belt OP   Bridge on ball NT 3x30\"  3x30\"  3x30\" 3x30\"    Bridges 2x20        Single leg bridge      2x10 B  " "  S/L TrA Brace 20x5\" 20x5\" 20x5\" 20x5\" 20x5\"  20x5\"    Seated [place] ROT NT        Seated herbert RDL 2x15 15#        Paloff Press 8# 10x5\" (B) 10# 2x10x5\" B 10# 2x10 5\" B      Big stick walkout    8# 10x B 10# 8x B  10# 10x B   Front Carry NT 20# 3 laps 20# 3 laps  20# 3 laps  20# 3 laps    Sciatic nerve glide 2x10 2x10 B 2x10 B 2x10 B  2x10 B  3x10 B    Rack pull NT        Single leg RDL      10x B    Ther Ex         HEP reveiw         NS  8' L1 8' L1 8' L1 8' L1 8' L1 8' L1   PNE         TS Ext foam roll 3x10   3x10 3x10     Open book    10x B 10x B 10x B                               Ther Activity                           Gait Training                           Modalities                                                "

## 2025-01-27 ENCOUNTER — OFFICE VISIT (OUTPATIENT)
Age: 40
End: 2025-01-27
Payer: COMMERCIAL

## 2025-01-27 DIAGNOSIS — M54.41 ACUTE MIDLINE LOW BACK PAIN WITH BILATERAL SCIATICA: ICD-10-CM

## 2025-01-27 DIAGNOSIS — M54.16 LUMBAR RADICULOPATHY: Primary | ICD-10-CM

## 2025-01-27 DIAGNOSIS — M51.26 OTHER INTERVERTEBRAL DISC DISPLACEMENT, LUMBAR REGION: ICD-10-CM

## 2025-01-27 DIAGNOSIS — M54.42 ACUTE MIDLINE LOW BACK PAIN WITH BILATERAL SCIATICA: ICD-10-CM

## 2025-01-27 PROCEDURE — 97110 THERAPEUTIC EXERCISES: CPT

## 2025-01-27 PROCEDURE — 97140 MANUAL THERAPY 1/> REGIONS: CPT

## 2025-01-27 PROCEDURE — 97112 NEUROMUSCULAR REEDUCATION: CPT

## 2025-01-27 NOTE — PROGRESS NOTES
"Daily Note     Today's date: 2025  Patient name: Wellington Hollis  : 1985  MRN: [id number]  Referring provider: Beverly Flanagan PA-C  Dx:   Encounter Diagnosis     ICD-10-CM    1. Lumbar radiculopathy  M54.16       2. Acute midline low back pain with bilateral sciatica  M54.42     M54.41       3. Other intervertebral disc displacement, lumbar region  M51.26                      Subjective: Pain is ~4/10 this morning, better than most morning. \"Feels like my hips are locked up this morning\".       Objective: See treatment diary below      Assessment: Tolerated treatment well. Patient demonstrated good response to left flexion rotation mobilization of lumbar spine that improved symptom on R lumbar with extension HOC R side glide. Patient with consistent improvement in symptoms. Provided education regarding HEP and posture for home. Patient would benefit from continued PT.      Plan: Continue per plan of care.  Progress treatment as tolerated.  Follow up response to HEP next session. Flexion rotation mobilization as necessary.      Precautions: C3-7 fusion (),     Visit/Unit Tracking  AUTH Status:  Date    12v Used 11 12 (RE) 13 14 15 16 17 18    Remaining               Pertinent Findings:      POC End Date: 25                                                                                          Test / Measure     FOTO (Predicted 67) 53 52   Lumbar flexion ROM 60p 75%   Lumbar Ext  25%   Thoracic Ext  Poor   Core mmt 3/5 4-/5   Repeated motions  Hinge at L3   LLTT   (L) (+) Tibial bias           Treatment Based Classification: Primary Mobility Secondary CBT    Manuals    Neutral Gap G5 NT     CS gr V B CS gr V B   Lumbar PA           Thoracic pistol      T9 and T6 HVLAT T9 and T6 HVLAT T9 and T6 HVLAT T9 and T6 HVLAT T9 and T6 HVLAT T9 and T6 HVLAT   Left flexion rotation mobilization       CS   Neuro Re-Ed        " "  LTRs NT 10x3\" 10x3\" 10x3\"  10x3\"      REIL 2x10 3x10 belt OP 3x10 belt OP 3x10 belt OP 3x10 belt OP 3x10 belt OP 6x10 belt OP   REIL HOC        R SG 10x   Bridge on ball NT 3x30\"  3x30\"  3x30\" 3x30\"     Bridges 2x20         Single leg bridge      2x10 B     S/L TrA Brace 20x5\" 20x5\" 20x5\" 20x5\" 20x5\"  20x5\"     Seated Jimmie ROT NT         Seated jimmie RDL 2x15 15#         Paloff Press 8# 10x5\" (B) 10# 2x10x5\" B 10# 2x10 5\" B       Big stick walkout    8# 10x B 10# 8x B  10# 10x B    Front Carry NT 20# 3 laps 20# 3 laps  20# 3 laps  20# 3 laps     Sciatic nerve glide 2x10 2x10 B 2x10 B 2x10 B  2x10 B  3x10 B     Rack pull NT         Single leg RDL      10x B     Ther Ex          HEP reveiw       Pt edu CS   NS  8' L1 8' L1 8' L1 8' L1 8' L1 8' L1 8' L1   PNE          TS Ext foam roll 3x10   3x10 3x10      Open book    10x B 10x B 10x B                                   Ther Activity                              Gait Training                              Modalities                                                   "

## 2025-01-29 ENCOUNTER — OFFICE VISIT (OUTPATIENT)
Age: 40
End: 2025-01-29
Payer: COMMERCIAL

## 2025-01-29 DIAGNOSIS — M54.41 ACUTE MIDLINE LOW BACK PAIN WITH BILATERAL SCIATICA: ICD-10-CM

## 2025-01-29 DIAGNOSIS — M54.42 ACUTE MIDLINE LOW BACK PAIN WITH BILATERAL SCIATICA: ICD-10-CM

## 2025-01-29 DIAGNOSIS — M51.26 OTHER INTERVERTEBRAL DISC DISPLACEMENT, LUMBAR REGION: ICD-10-CM

## 2025-01-29 DIAGNOSIS — M54.16 LUMBAR RADICULOPATHY: Primary | ICD-10-CM

## 2025-01-29 PROCEDURE — 97112 NEUROMUSCULAR REEDUCATION: CPT

## 2025-01-29 PROCEDURE — 97140 MANUAL THERAPY 1/> REGIONS: CPT

## 2025-01-29 NOTE — PROGRESS NOTES
"Daily Note     Today's date: 2025  Patient name: Wellington Hollis  : 1985  MRN: [id number]  Referring provider: Beverly Flanagan PA-C  Dx:   Encounter Diagnosis     ICD-10-CM    1. Lumbar radiculopathy  M54.16       2. Acute midline low back pain with bilateral sciatica  M54.42     M54.41       3. Other intervertebral disc displacement, lumbar region  M51.26                      Subjective: Patient reports less overall pain in lumbar spine but \"still feels locked up\" in low back. Felt better after last session and feels he's maintained that improvement since last session.       Objective: See treatment diary below      Assessment: Tolerated treatment well. Continues to note improvement with thoracic and lumbar manipulation, improvement in all motions following. Continued with left flexion rotation of lumbar spine with improvement in lumbar symptoms. Addition of stability again with good response. Provided education regarding HEP and posture for home. Patient would benefit from continued PT.      Plan: Continue per plan of care.  Progress treatment as tolerated.  Follow up response to HEP next session. Flexion rotation mobilization as necessary.      Precautions: C3-7 fusion (2018),     Visit/Unit Tracking  AUTH Status:  Date     12v Used 11 12 (RE) 13 14 15 16 17 18 19     Remaining           RE      Pertinent Findings:      POC End Date: 25                                                                                          Test / Measure     FOTO (Predicted 67) 53 52   Lumbar flexion ROM 60p 75%   Lumbar Ext  25%   Thoracic Ext  Poor   Core mmt 3/5 4-/5   Repeated motions  Hinge at L3   LLTT   (L) (+) Tibial bias           Treatment Based Classification: Primary Mobility Secondary CBT    Manuals    Neutral Gap G5 NT     CS gr V B CS gr V B CS gr V B   Lumbar PA            Thoracic pistol      T9 and T6 " "HVLAT T9 and T6 HVLAT T9 and T6 HVLAT T9 and T6 HVLAT T9 and T6 HVLAT T9 and T6 HVLAT T9 and T6 HVLAT   Left flexion rotation mobilization       CS CS   Neuro Re-Ed           LTRs NT 10x3\" 10x3\" 10x3\"  10x3\"       REIL 2x10 3x10 belt OP 3x10 belt OP 3x10 belt OP 3x10 belt OP 3x10 belt OP 6x10 belt OP 5x10 belt OP    REIL HOC        R SG 10x R SG 10x   Bridge on ball NT 3x30\"  3x30\"  3x30\" 3x30\"      Bridges 2x20          Single leg bridge      2x10 B   2x10 B    S/L TrA Brace 20x5\" 20x5\" 20x5\" 20x5\" 20x5\"  20x5\"   20x5\"    Seated Jimmie ROT NT          Seated jimmie RDL 2x15 15#          Paloff Press 8# 10x5\" (B) 10# 2x10x5\" B 10# 2x10 5\" B        Big stick walkout    8# 10x B 10# 8x B  10# 10x B  10# 10x B   Front Carry NT 20# 3 laps 20# 3 laps  20# 3 laps  20# 3 laps      Sciatic nerve glide 2x10 2x10 B 2x10 B 2x10 B  2x10 B  3x10 B      Rack pull NT          Single leg RDL      10x B      Ther Ex           HEP reveiw       Pt edu CS    NS  8' L1 8' L1 8' L1 8' L1 8' L1 8' L1 8' L1 8' L1   PNE           TS Ext foam roll 3x10   3x10 3x10       Open book    10x B 10x B 10x B                                       Ther Activity                                 Gait Training                                 Modalities                                                      "

## 2025-01-30 ENCOUNTER — HOSPITAL ENCOUNTER (OUTPATIENT)
Dept: RADIOLOGY | Facility: MEDICAL CENTER | Age: 40
Discharge: HOME | End: 2025-01-30
Payer: COMMERCIAL

## 2025-01-30 ENCOUNTER — APPOINTMENT (OUTPATIENT)
Age: 40
End: 2025-01-30
Payer: COMMERCIAL

## 2025-01-30 VITALS
RESPIRATION RATE: 18 BRPM | HEART RATE: 80 BPM | OXYGEN SATURATION: 96 % | SYSTOLIC BLOOD PRESSURE: 142 MMHG | DIASTOLIC BLOOD PRESSURE: 86 MMHG | TEMPERATURE: 98 F

## 2025-01-30 DIAGNOSIS — M51.26 LUMBAR DISC DISPLACEMENT WITHOUT MYELOPATHY: ICD-10-CM

## 2025-01-30 DIAGNOSIS — M54.16 LUMBAR RADICULOPATHY: ICD-10-CM

## 2025-01-30 PROCEDURE — 64483 NJX AA&/STRD TFRM EPI L/S 1: CPT | Performed by: ANESTHESIOLOGY

## 2025-01-30 RX ORDER — METHYLPREDNISOLONE ACETATE 40 MG/ML
80 INJECTION, SUSPENSION INTRA-ARTICULAR; INTRALESIONAL; INTRAMUSCULAR; PARENTERAL; SOFT TISSUE ONCE
Status: COMPLETED | OUTPATIENT
Start: 2025-01-30 | End: 2025-01-30

## 2025-01-30 RX ORDER — BUPIVACAINE HCL/PF 2.5 MG/ML
3.5 VIAL (ML) INJECTION ONCE
Status: COMPLETED | OUTPATIENT
Start: 2025-01-30 | End: 2025-01-30

## 2025-01-30 RX ADMIN — METHYLPREDNISOLONE ACETATE 80 MG: 40 INJECTION, SUSPENSION INTRA-ARTICULAR; INTRALESIONAL; INTRAMUSCULAR; SOFT TISSUE at 13:24

## 2025-01-30 RX ADMIN — IOHEXOL 2 ML: 300 INJECTION, SOLUTION INTRAVENOUS at 13:23

## 2025-01-30 RX ADMIN — BUPIVACAINE HYDROCHLORIDE 3.5 ML: 2.5 INJECTION, SOLUTION EPIDURAL; INFILTRATION; INTRACAUDAL at 13:24

## 2025-01-30 NOTE — DISCHARGE INSTR - LAB
Epidural Steroid Injection   WHAT YOU NEED TO KNOW:   An epidural steroid injection (LILIANE) is a procedure to inject steroid medicine into the epidural space. The epidural space is between your spinal cord and vertebrae. Steroids reduce inflammation and fluid buildup in your spine that may be causing pain. You may be given pain medicine along with the steroids.          ACTIVITY  Do not drive or operate machinery today.  No strenuous activity today - bending, lifting, etc.  You may resume normal activites starting tomorrow - start slowly and as tolerated.  You may shower today, but no tub baths or hot tubs.  You may have numbness for several hours from the local anesthetic. Please use caution and common sense, especially with weight-bearing activities.    CARE OF THE INJECTION SITE  If you have soreness or pain, apply ice to the area today (20 minutes on/20 minutes off).  Starting tomorrow, you may use warm, moist heat or ice if needed.  You may have an increase or change in your discomfort for 36-48 hours after your treatment.  Apply ice and continue with any pain medication you have been prescribed.  Notify the [facility] if you have any of the following: redness, drainage, swelling, headache, stiff neck or fever above 100°F.    SPECIAL INSTRUCTIONS  Our office will contact you in approximately 14 days for a progress report.    MEDICATIONS  Continue to take all routine medications.  Our office may have instructed you to hold some medications.    As no general anesthesia was used in today's procedure, you should not experience any side effects related to anesthesia.     If you are diabetic, the steroids used in today's injection may temporarily increase your blood sugar levels after the first few days after your injection. Please keep a close eye on your sugars and alert the doctor who manages your diabetes if your sugars are significantly high from your baseline or you are symptomatic.     If you have a  problem specifically related to your procedure, please call our office at [phone].  Problems not related to your procedure should be directed to your primary care physician.

## 2025-01-30 NOTE — H&P
"History of Present Illness: The patient is a 39 y.o. male who presents with complaints of back and leg pain    Past Medical History:   Diagnosis Date    Cervical disc disorder 2016    Degenerative Disc disease    Low back pain 2016    Pain    Lumbosacral disc disease 2018    Pain    Thoracic disc disorder 2018    Pain       No past surgical history on file.      Current Outpatient Medications:     aspirin (ECOTRIN LOW STRENGTH) 81 mg EC tablet, , Disp: , Rfl:     raNITIdine HCl (ZANTAC 75 PO), , Disp: , Rfl:     Current Facility-Administered Medications:     bupivacaine (PF) (MARCAINE) 0.25 % injection 3.5 mL, 3.5 mL, Epidural, Once, Will Ayo Pretty MD    iohexol (OMNIPAQUE) 300 mg/mL injection 2 mL, 2 mL, Epidural, Once, Will Ayo Pretty MD    methylPREDNISolone acetate (DEPO-MEDROL) injection 80 mg, 80 mg, Intra-articular, Once, Will Ayo Pretty MD    Allergies   Allergen Reactions    Methocarbamol Other (See Comments)     \"MS symptoms\" muscle weakness, numbness, and tingling.       Physical Exam:   Vitals:    01/30/25 1302   BP: 137/87   Pulse: 74   Resp: 18   Temp: 98 °F (36.7 °C)   SpO2: 99%     General: Awake, Alert, Oriented x 3, Mood and affect appropriate  Respiratory: Respirations even and unlabored  Cardiovascular: Peripheral pulses intact; no edema  Musculoskeletal Exam: back and leg pain    ASA Score: 2    Patient/Chart Verification  Patient ID Verified: Verbal  ID Band Applied: No  Consents Confirmed: To be obtained in the Procedural area  Interval H&P(within 24 hr) Complete (required for Outpatients and Surgery Admit only): To be obtained in the Procedural area  Allergies Reviewed: Yes  Anticoag/NSAID held?: NA  Currently on antibiotics?: No    Assessment:   1. Lumbar radiculopathy    2. Lumbar disc displacement without myelopathy        Plan: bilateral L5 TF LILIANE    "

## 2025-02-03 ENCOUNTER — EVALUATION (OUTPATIENT)
Age: 40
End: 2025-02-03
Payer: COMMERCIAL

## 2025-02-03 DIAGNOSIS — M54.16 LUMBAR RADICULOPATHY: Primary | ICD-10-CM

## 2025-02-03 DIAGNOSIS — M51.26 OTHER INTERVERTEBRAL DISC DISPLACEMENT, LUMBAR REGION: ICD-10-CM

## 2025-02-03 DIAGNOSIS — M54.41 ACUTE MIDLINE LOW BACK PAIN WITH BILATERAL SCIATICA: ICD-10-CM

## 2025-02-03 DIAGNOSIS — M54.42 ACUTE MIDLINE LOW BACK PAIN WITH BILATERAL SCIATICA: ICD-10-CM

## 2025-02-03 PROCEDURE — 97112 NEUROMUSCULAR REEDUCATION: CPT

## 2025-02-03 PROCEDURE — 97140 MANUAL THERAPY 1/> REGIONS: CPT

## 2025-02-03 PROCEDURE — 97110 THERAPEUTIC EXERCISES: CPT

## 2025-02-03 NOTE — PROGRESS NOTES
Daily Note / Reassessment     Today's date: 2/3/2025  Patient name: Wellington Hollis  : 1985  MRN: [id number]  Referring provider: Beverly Flanagan PA-C  Dx:   Encounter Diagnosis     ICD-10-CM    1. Lumbar radiculopathy  M54.16       2. Acute midline low back pain with bilateral sciatica  M54.42     M54.41       3. Other intervertebral disc displacement, lumbar region  M51.26                      Subjective:  Pt reports being about 75% back to the desired level of function at this time. Had 2 CSI in spine this past Thursday, over the weekend had less pain and more soreness, able to do more with exercises this morning without as much pain as before. Patient still has difficulty with endurance activities, being on feet 30-60 min starting to feel pain. Patient continues to express wanting to avoid surgery at this time.  Feels he is making progress with therapy.    Objective: See treatment diary below    Assessment: Wellington Hollis has been seen for 20 sessions of PT related to chronic LBP with radicular symptoms. They have improved with: Lumbar ROM, symptom centralization, and positional tolerance. They remain limited with: Lumbar extension, spinal stabilizer activation, and LLTT. Current FOTO outcome measure score is 60, compared to 53 at IE. They would benefit from continued course of PT to meet all established goals and facilitate return to PLOF.       Plan: Continue per plan of care.   Frequency: 1x/week  POC Begin Date: 2/3/25  POC End Date: 25       Precautions: C3-7 fusion (2018),     Visit/Unit Tracking  AUTH Status:  Date 1/2 1/7 1/9 1/13 1/16 1/20 1/23 1/27 1/29 2/3   12v Used 11 12 (RE) 13 14 15 16 17 18 19 20    Remaining           RE         Pertinent Findings:      POC End Date: 25                                                                                       Test / Measure  11/20 1/7 2/3   FOTO (Predicted 67) 53 52 60   Lumbar flexion ROM 60p 75% 100%   Lumbar Ext  25% 75%  "  Thoracic Ext  Poor No limitation   Core mmt 3/5 4-/5 4+/5   Repeated motions  Hinge at L3 No hinge   LLTT   (L) (+) Tibial bias             Treatment Based Classification: Primary Mobility Secondary CBT    Manuals 1/7 1/9 1/13 1/16 1/20 1/23 1/27 1/29 2/3   Neutral Gap G5 NT     CS gr V B CS gr V B CS gr V B CS gr V B   Lumbar PA             Thoracic pistol      T9 and T6 HVLAT T9 and T6 HVLAT T9 and T6 HVLAT T9 and T6 HVLAT T9 and T6 HVLAT T9 and T6 HVLAT T9 and T6 HVLAT T9 and T6 HVLAT   Left flexion rotation mobilization       CS CS    Neuro Re-Ed            LTRs NT 10x3\" 10x3\" 10x3\"  10x3\"        REIL 2x10 3x10 belt OP 3x10 belt OP 3x10 belt OP 3x10 belt OP 3x10 belt OP 6x10 belt OP 5x10 belt OP  3x10 belt OP   REIL HOC        R SG 10x R SG 10x    Bridge on ball NT 3x30\"  3x30\"  3x30\" 3x30\"       Bridges 2x20           Single leg bridge      2x10 B   2x10 B  2x10 B    S/L TrA Brace 20x5\" 20x5\" 20x5\" 20x5\" 20x5\"  20x5\"   20x5\"     Seated [place] ROT NT           Seated herbert RDL 2x15 15#           Paloff Press 8# 10x5\" (B) 10# 2x10x5\" B 10# 2x10 5\" B         Big stick walkout    8# 10x B 10# 8x B  10# 10x B  10# 10x B 12# 10x B    Front Carry NT 20# 3 laps 20# 3 laps  20# 3 laps  20# 3 laps       Sciatic nerve glide 2x10 2x10 B 2x10 B 2x10 B  2x10 B  3x10 B       Rack pull NT        95# 2x10    Single leg RDL      10x B       Bird dog         Rtb 2x10 B   Ther Ex            HEP reveiw       Pt edu CS     NS  8' L1 8' L1 8' L1 8' L1 8' L1 8' L1 8' L1 8' L1 8' L1   PNE            TS Ext foam roll 3x10   3x10 3x10        Open book    10x B 10x B 10x B                               Pt edu         CS assess and POC   Ther Activity                                    Gait Training                                    Modalities                                                       "

## 2025-02-06 ENCOUNTER — APPOINTMENT (OUTPATIENT)
Age: 40
End: 2025-02-06
Payer: COMMERCIAL

## 2025-02-06 DIAGNOSIS — M51.26 OTHER INTERVERTEBRAL DISC DISPLACEMENT, LUMBAR REGION: ICD-10-CM

## 2025-02-06 DIAGNOSIS — M54.16 LUMBAR RADICULOPATHY: Primary | ICD-10-CM

## 2025-02-06 DIAGNOSIS — M54.42 ACUTE MIDLINE LOW BACK PAIN WITH BILATERAL SCIATICA: ICD-10-CM

## 2025-02-06 DIAGNOSIS — M54.41 ACUTE MIDLINE LOW BACK PAIN WITH BILATERAL SCIATICA: ICD-10-CM

## 2025-02-06 NOTE — PROGRESS NOTES
"Daily Note     Today's date: 2025  Patient name: Wellington Hollis  : 1985  MRN: [id number]  Referring provider: Beverly Flanagan PA-C  Dx:   Encounter Diagnosis     ICD-10-CM    1. Lumbar radiculopathy  M54.16       2. Acute midline low back pain with bilateral sciatica  M54.42     M54.41       3. Other intervertebral disc displacement, lumbar region  M51.26                      Subjective: ***      Objective: See treatment diary below      Assessment: Tolerated treatment {Tolerated treatment :0681606968}. Patient {assessment:4212710458}      Plan: {PLAN:5934682966}     Precautions: C3-7 fusion (),     Visit/Unit Tracking  AUTH Status:  Date 1/2 1/7 1/9 1/13 1/16 1/20 1/23 1/27 1/29 2/3 2/6   12v Used 11 12 (RE) 13 14 15 16 17 18 19 20 21    Remaining           RE          Pertinent Findings:      POC End Date: 25                                                                                       Test / Measure  11/20 1/7 2/3   FOTO (Predicted 67) 53 52 60   Lumbar flexion ROM 60p 75% 100%   Lumbar Ext  25% 75%   Thoracic Ext  Poor No limitation   Core mmt 3/5 4-/5 4+/5   Repeated motions  Hinge at L3 No hinge   LLTT   (L) (+) Tibial bias             Treatment Based Classification: Primary Mobility Secondary CBT    Manuals 1/7 1/9 1/13 1/16 1/20 1/23 1/27 1/29 2/3 2/6   Neutral Gap G5 NT     CS gr V B CS gr V B CS gr V B CS gr V B    Lumbar PA              Thoracic pistol      T9 and T6 HVLAT T9 and T6 HVLAT T9 and T6 HVLAT T9 and T6 HVLAT T9 and T6 HVLAT T9 and T6 HVLAT T9 and T6 HVLAT T9 and T6 HVLAT    Left flexion rotation mobilization       CS CS     Neuro Re-Ed             LTRs NT 10x3\" 10x3\" 10x3\"  10x3\"         REIL 2x10 3x10 belt OP 3x10 belt OP 3x10 belt OP 3x10 belt OP 3x10 belt OP 6x10 belt OP 5x10 belt OP  3x10 belt OP    REIL HOC        R SG 10x R SG 10x     Bridge on ball NT 3x30\"  3x30\"  3x30\" 3x30\"        Bridges 2x20            Single leg bridge      2x10 B   2x10 B  2x10 B   " "  S/L TrA Brace 20x5\" 20x5\" 20x5\" 20x5\" 20x5\"  20x5\"   20x5\"      Seated Jimmie ROT NT            Seated jimmie RDL 2x15 15#            Paloff Press 8# 10x5\" (B) 10# 2x10x5\" B 10# 2x10 5\" B          Big stick walkout    8# 10x B 10# 8x B  10# 10x B  10# 10x B 12# 10x B     Front Carry NT 20# 3 laps 20# 3 laps  20# 3 laps  20# 3 laps        Sciatic nerve glide 2x10 2x10 B 2x10 B 2x10 B  2x10 B  3x10 B        Rack pull NT        95# 2x10     Single leg RDL      10x B        Bird dog         Rtb 2x10 B    Ther Ex             HEP reveiw       Pt edu CS      NS  8' L1 8' L1 8' L1 8' L1 8' L1 8' L1 8' L1 8' L1 8' L1 ***   PNE             TS Ext foam roll 3x10   3x10 3x10         Open book    10x B 10x B 10x B                                  Pt edu         CS assess and POC    Ther Activity                                       Gait Training                                       Modalities                                                            "

## 2025-02-07 ENCOUNTER — OFFICE VISIT (OUTPATIENT)
Age: 40
End: 2025-02-07
Payer: COMMERCIAL

## 2025-02-07 DIAGNOSIS — M54.41 ACUTE MIDLINE LOW BACK PAIN WITH BILATERAL SCIATICA: ICD-10-CM

## 2025-02-07 DIAGNOSIS — M54.42 ACUTE MIDLINE LOW BACK PAIN WITH BILATERAL SCIATICA: ICD-10-CM

## 2025-02-07 DIAGNOSIS — M54.16 LUMBAR RADICULOPATHY: Primary | ICD-10-CM

## 2025-02-07 DIAGNOSIS — M51.26 OTHER INTERVERTEBRAL DISC DISPLACEMENT, LUMBAR REGION: ICD-10-CM

## 2025-02-07 PROCEDURE — 97112 NEUROMUSCULAR REEDUCATION: CPT | Performed by: PHYSICAL THERAPIST

## 2025-02-07 PROCEDURE — 97110 THERAPEUTIC EXERCISES: CPT | Performed by: PHYSICAL THERAPIST

## 2025-02-07 NOTE — PROGRESS NOTES
Daily Note    Today's date: 2025  Patient name: Wellington Hollis  : 1985  MRN: [id number]  Referring provider: Beverly Flanagan PA-C  Dx:   Encounter Diagnosis     ICD-10-CM    1. Lumbar radiculopathy  M54.16       2. Acute midline low back pain with bilateral sciatica  M54.42     M54.41       3. Other intervertebral disc displacement, lumbar region  M51.26           Start Time: 0900  Stop Time: 09  Total time in clinic (min): 45 minutes    Subjective: Pt reports feeling an injection is wearing off slightly since last session.  Trying to do HEP more often. Pain reported as 2/10 at start of session.         Objective: See treatment diary below.         Assessment:  Wellington Hollis was progressed with PT interventions, focusing on appropriate technique and intensity.  Patient allowed to tolerate repeated motion exercise and complete lumbar stability exercises with only occasional stopping due to discomfort. Pt would benefit from continued skilled PT to resolve remaining functional impairments and facilitate return to PLOF.       Plan: Continue per plan of care.  Progress treatment as tolerated.        POC End Date: 25       Precautions: C3-7 fusion (),     Visit/Unit Tracking  AUTH Status:  Date             12v Used 21             Remaining                    Pertinent Findings:      POC End Date: 25                                                                                       Test / Measure  11/20 1/7 2/3   FOTO (Predicted 67) 53 52 60   Lumbar flexion ROM 60p 75% 100%   Lumbar Ext  25% 75%   Thoracic Ext  Poor No limitation   Core mmt 3/5 4-/5 4+/5   Repeated motions  Hinge at L3 No hinge   LLTT   (L) (+) Tibial bias             Treatment Based Classification: Primary Mobility Secondary CBT    Manuals    Neutral Gap G5    Lumbar PA     Thoracic pistol      Left flexion rotation mobilization    Neuro Re-Ed    LTRs    REIL 3x10 belt OP   REIL HOC     Bridge on ball    Bridges     Single leg bridge 2x10 B    S/L TrA Brace    Seated Jimmie ROT    Seated jimmie RDL    Paloff Press    Big stick walkout 12# 10x B    Front Carry    Sciatic nerve glide    Rack pull 95# 2x10    Single leg RDL    Bird dog Rtb 2x10 B   Ther Ex    HEP reveiw    NS  8' L1   PNE    TS Ext foam roll    Open book            Pt edu CS assess and POC   Ther Activity            Gait Training            Modalities

## 2025-02-10 ENCOUNTER — APPOINTMENT (OUTPATIENT)
Age: 40
End: 2025-02-10
Payer: COMMERCIAL

## 2025-02-12 ENCOUNTER — OFFICE VISIT (OUTPATIENT)
Age: 40
End: 2025-02-12
Payer: COMMERCIAL

## 2025-02-12 DIAGNOSIS — M54.42 ACUTE MIDLINE LOW BACK PAIN WITH BILATERAL SCIATICA: ICD-10-CM

## 2025-02-12 DIAGNOSIS — M54.16 LUMBAR RADICULOPATHY: Primary | ICD-10-CM

## 2025-02-12 DIAGNOSIS — M54.41 ACUTE MIDLINE LOW BACK PAIN WITH BILATERAL SCIATICA: ICD-10-CM

## 2025-02-12 DIAGNOSIS — M51.26 OTHER INTERVERTEBRAL DISC DISPLACEMENT, LUMBAR REGION: ICD-10-CM

## 2025-02-12 PROCEDURE — 97112 NEUROMUSCULAR REEDUCATION: CPT

## 2025-02-12 PROCEDURE — 97140 MANUAL THERAPY 1/> REGIONS: CPT

## 2025-02-12 NOTE — PROGRESS NOTES
Daily Note    Today's date: 2025  Patient name: Wellington Hollis  : 1985  MRN: [id number]  Referring provider: Beverly Flanagan PA-C  Dx:   Encounter Diagnosis     ICD-10-CM    1. Lumbar radiculopathy  M54.16       2. Acute midline low back pain with bilateral sciatica  M54.42     M54.41       3. Other intervertebral disc displacement, lumbar region  M51.26                      Subjective: Patient reports pain is much less, stiffness in the morning mostly.         Objective: See treatment diary below.         Assessment:  Wellington Hlolis was progressed with PT interventions, focusing on appropriate technique and intensity.  Patient with favorable response to lumbar gapping mobs, improvement in lumbar motion following. Increased reps with SL bridge with fatigue noted. Pt would benefit from continued skilled PT to resolve remaining functional impairments and facilitate return to PLOF.       Plan: Continue per plan of care.  Progress treatment as tolerated.        POC End Date: 25       Precautions: C3-7 fusion (),     Visit/Unit Tracking  AUTH Status:  Date            12v Used 21 22            Remaining                    Pertinent Findings:      POC End Date: 25                                                                                       Test / Measure  11/20 1/7 2/3   FOTO (Predicted 67) 53 52 60   Lumbar flexion ROM 60p 75% 100%   Lumbar Ext  25% 75%   Thoracic Ext  Poor No limitation   Core mmt 3/5 4-/5 4+/5   Repeated motions  Hinge at L3 No hinge   LLTT   (L) (+) Tibial bias             Treatment Based Classification: Primary Mobility Secondary CBT    Manuals    Neutral Gap G5  CS Gr III-IV B   Lumbar PA      Thoracic pistol       Left flexion rotation mobilization     Neuro Re-Ed     LTRs     REIL 3x10 belt OP 3x10    REIL HOC      Bridge on ball     Bridges     Single leg bridge 2x10 B  3x10 B   S/L TrA Brace     Seated Jimmie ROT     Seated jimmie RDL      Paloff Press     Big stick walkout 12# 10x B  12# 2x10   Front Carry     Sciatic nerve glide     Rack pull 95# 2x10  95# 2x10   Single leg RDL     Bird dog Rtb 2x10 B Rtb 2x10 B    Ther Ex     HEP reveiw     NS  8' L1 8' L1   PNE     TS Ext foam roll     Open book               Pt edu CS assess and POC    Ther Activity               Gait Training               Modalities

## 2025-02-13 ENCOUNTER — APPOINTMENT (OUTPATIENT)
Age: 40
End: 2025-02-13
Payer: COMMERCIAL

## 2025-02-13 ENCOUNTER — TELEPHONE (OUTPATIENT)
Dept: PAIN MEDICINE | Facility: CLINIC | Age: 40
End: 2025-02-13

## 2025-02-13 NOTE — TELEPHONE ENCOUNTER
Caller: Patient    Doctor: Dr. Pretty    Reason for call: Pt reports  90% improvement post injection  Pain level  1/10     Patient would like to know if he is able to get a virtual visit for his OV on 3/11    Since he is not having pain, to avoid the drive      Call back#:

## 2025-02-14 ENCOUNTER — OFFICE VISIT (OUTPATIENT)
Age: 40
End: 2025-02-14
Payer: COMMERCIAL

## 2025-02-14 DIAGNOSIS — M51.26 OTHER INTERVERTEBRAL DISC DISPLACEMENT, LUMBAR REGION: ICD-10-CM

## 2025-02-14 DIAGNOSIS — M54.42 ACUTE MIDLINE LOW BACK PAIN WITH BILATERAL SCIATICA: ICD-10-CM

## 2025-02-14 DIAGNOSIS — M54.41 ACUTE MIDLINE LOW BACK PAIN WITH BILATERAL SCIATICA: ICD-10-CM

## 2025-02-14 DIAGNOSIS — M54.16 LUMBAR RADICULOPATHY: Primary | ICD-10-CM

## 2025-02-14 PROCEDURE — 97112 NEUROMUSCULAR REEDUCATION: CPT

## 2025-02-14 PROCEDURE — 97110 THERAPEUTIC EXERCISES: CPT

## 2025-02-14 NOTE — PROGRESS NOTES
Daily Note    Today's date: 2025  Patient name: Wellington Hollis  : 1985  MRN: [id number]  Referring provider: Beverly Flanagan PA-C  Dx:   Encounter Diagnosis     ICD-10-CM    1. Lumbar radiculopathy  M54.16       2. Other intervertebral disc displacement, lumbar region  M51.26       3. Acute midline low back pain with bilateral sciatica  M54.42     M54.41                      Subjective: Overall back isn't feeling bad this morning.         Objective: See treatment diary below.         Assessment:  Wellington Hollis was progressed with PT interventions, focusing on appropriate technique and intensity.  Patient able to perform increased intensity standing and functional core and LE activation without adverse reaction. Educated about soreness following today's progressions with patient verbalizing understanding. Pt would benefit from continued skilled PT to resolve remaining functional impairments and facilitate return to PLOF.       Plan: Continue per plan of care.  Progress treatment as tolerated.        POC End Date: 25       Precautions: C3-7 fusion (),     Visit/Unit Tracking  AUTH Status:  Date           12v Used 21 22 23           Remaining                    Pertinent Findings:      POC End Date: 25                                                                                       Test / Measure  11/20 1/7 2/3   FOTO (Predicted 67) 53 52 60   Lumbar flexion ROM 60p 75% 100%   Lumbar Ext  25% 75%   Thoracic Ext  Poor No limitation   Core mmt 3/5 4-/5 4+/5   Repeated motions  Hinge at L3 No hinge   LLTT   (L) (+) Tibial bias             Treatment Based Classification: Primary Mobility Secondary CBT    Manuals    Neutral Gap G5  CS Gr III-IV B    Lumbar PA       Thoracic pistol        Left flexion rotation mobilization      Neuro Re-Ed      LTRs      REIL 3x10 belt OP 3x10  3x10    REIL HOC       Bridge on ball      Bridges      Single leg bridge 2x10 B  3x10 B  3x10 B    S/L TrA Brace      Seated Jimmie ROT      Seated jimmie RDL      Paloff Press      Big stick walkout 12# 10x B  12# 2x10    D1 lift   8# 10x B    Squat with kb    2x10 20# kb chair tap   Front Carry      Sciatic nerve glide      Rack pull 95# 2x10  95# 2x10 95# 3x10   Lunges    2 laps 30 ft   Single leg RDL      Bird dog Rtb 2x10 B Rtb 2x10 B  Gtb 2x10 B    Leg press   115# 10x  135# 2x10   Ther Ex      HEP reveiw      NS  8' L1 8' L1 8' L1   PNE      TS Ext foam roll      Open book                  Pt edu CS assess and POC     Ther Activity                  Gait Training                  Modalities

## 2025-02-19 ENCOUNTER — OFFICE VISIT (OUTPATIENT)
Age: 40
End: 2025-02-19
Payer: COMMERCIAL

## 2025-02-19 DIAGNOSIS — M54.41 ACUTE MIDLINE LOW BACK PAIN WITH BILATERAL SCIATICA: ICD-10-CM

## 2025-02-19 DIAGNOSIS — M54.16 LUMBAR RADICULOPATHY: Primary | ICD-10-CM

## 2025-02-19 DIAGNOSIS — M51.26 OTHER INTERVERTEBRAL DISC DISPLACEMENT, LUMBAR REGION: ICD-10-CM

## 2025-02-19 DIAGNOSIS — M54.42 ACUTE MIDLINE LOW BACK PAIN WITH BILATERAL SCIATICA: ICD-10-CM

## 2025-02-19 PROCEDURE — 97110 THERAPEUTIC EXERCISES: CPT

## 2025-02-19 PROCEDURE — 97112 NEUROMUSCULAR REEDUCATION: CPT

## 2025-02-19 NOTE — PROGRESS NOTES
Daily Note    Today's date: 2025  Patient name: Wellington Hollis  : 1985  MRN: [id number]  Referring provider: Beverly Flanagan PA-C  Dx:   Encounter Diagnosis     ICD-10-CM    1. Lumbar radiculopathy  M54.16       2. Other intervertebral disc displacement, lumbar region  M51.26       3. Acute midline low back pain with bilateral sciatica  M54.42     M54.41                      Subjective: Patient reports soreness 2 days following last session, thinks mostly related to muscle soreness. Able to do some of the exercises and felt looser after, since resolved. Felt stiff this morning but did some of the exercises to help.         Objective: See treatment diary below.         Assessment:  Wellington Hollis was progressed with PT interventions, focusing on appropriate technique and intensity.  Treatment largely unchanged due to soreness following last session. Assess response to determine appropriateness for progression at next session. Pt would benefit from continued skilled PT to resolve remaining functional impairments and facilitate return to PLOF.       Plan: Continue per plan of care.  Progress treatment as tolerated.        POC End Date: 25       Precautions: C3-7 fusion (),     Visit/Unit Tracking  AUTH Status:  Date          12v Used 21 22 23 24          Remaining                    Pertinent Findings:      POC End Date: 25                                                                                       Test / Measure  11/20 1/7 2/3   FOTO (Predicted 67) 53 52 60   Lumbar flexion ROM 60p 75% 100%   Lumbar Ext  25% 75%   Thoracic Ext  Poor No limitation   Core mmt 3/5 4-/5 4+/5   Repeated motions  Hinge at L3 No hinge   LLTT   (L) (+) Tibial bias             Treatment Based Classification: Primary Mobility Secondary CBT    Manuals    Neutral Gap G5  CS Gr III-IV B     Lumbar PA        Thoracic pistol         Left flexion rotation mobilization        Neuro Re-Ed       LTRs       REIL 3x10 belt OP 3x10  3x10  2x10   REIL HOC        Bridge on ball       Bridges       Single leg bridge 2x10 B  3x10 B 3x10 B  3x10 B    S/L TrA Brace       Seated Jimmie ROT       Seated jimmie RDL       Paloff Press       Big stick walkout 12# 10x B  12# 2x10     D1 lift   8# 10x B  8# 10x B    Squat with kb    2x10 20# kb chair tap 2x10 20# kb chair tap   Front Carry       Sciatic nerve glide       Rack pull 95# 2x10  95# 2x10 95# 3x10 95# 3x10   Lunges    2 laps 30 ft 2 laps 30 ft   Single leg RDL       Bird dog Rtb 2x10 B Rtb 2x10 B  Gtb 2x10 B  Gtb 2x10 B   Leg press   115# 10x  135# 2x10 125# 10x  135# 2x10   Ther Ex       HEP reveiw       NS  8' L1 8' L1 8' L1 8' L1   PNE       TS Ext foam roll       Open book                     Pt edu CS assess and POC      Ther Activity                     Gait Training                     Modalities

## 2025-02-26 ENCOUNTER — OFFICE VISIT (OUTPATIENT)
Age: 40
End: 2025-02-26
Payer: COMMERCIAL

## 2025-02-26 DIAGNOSIS — M54.16 LUMBAR RADICULOPATHY: Primary | ICD-10-CM

## 2025-02-26 DIAGNOSIS — M54.42 ACUTE MIDLINE LOW BACK PAIN WITH BILATERAL SCIATICA: ICD-10-CM

## 2025-02-26 DIAGNOSIS — M54.41 ACUTE MIDLINE LOW BACK PAIN WITH BILATERAL SCIATICA: ICD-10-CM

## 2025-02-26 DIAGNOSIS — M51.26 OTHER INTERVERTEBRAL DISC DISPLACEMENT, LUMBAR REGION: ICD-10-CM

## 2025-02-26 PROCEDURE — 97112 NEUROMUSCULAR REEDUCATION: CPT

## 2025-02-26 PROCEDURE — 97110 THERAPEUTIC EXERCISES: CPT

## 2025-02-26 NOTE — PROGRESS NOTES
Daily Note    Today's date: 2025  Patient name: Wellington Hollis  : 1985  MRN: [id number]  Referring provider: Beverly Flanagan PA-C  Dx:   Encounter Diagnosis     ICD-10-CM    1. Lumbar radiculopathy  M54.16       2. Other intervertebral disc displacement, lumbar region  M51.26       3. Acute midline low back pain with bilateral sciatica  M54.42     M54.41                      Subjective: Patient reports able to do much more functionally. States he went fishing Monday and walking around [place] creek which felt fine while doing but was a little sore, 3-4/10, afterwards. Still notes getting some stiffness mostly in the morning but improves after doing exercises.         Objective: See treatment diary below.         Assessment:  Wellington Hollis was progressed with PT interventions, focusing on appropriate technique and intensity. Patient is doing extremely well with functional activities, noted improvement with doing activities at home with significantly decreased levels of pain. Continued today's treatment with functional strengthening. Patient would benefit from following up next week to assure readiness for d/c to [facility]. Patient verbalized agreement and understanding to this decision.      Plan: Potential d/c next visit.      POC End Date: 3/12/25       Precautions: C3-7 fusion (2018),     Visit/Unit Tracking  AUTH Status:  Date         12v Used 21 22 23 24 23         Remaining                    Pertinent Findings:      POC End Date: 25                                                                                       Test / Measure  11/20 1/7 2/3   FOTO (Predicted 67) 53 52 60   Lumbar flexion ROM 60p 75% 100%   Lumbar Ext  25% 75%   Thoracic Ext  Poor No limitation   Core mmt 3/5 4-/5 4+/5   Repeated motions  Hinge at L3 No hinge   LLTT   (L) (+) Tibial bias             Treatment Based Classification: Primary Mobility Secondary CBT    Manuals    Neutral  Gap G5  CS Gr III-IV B      Lumbar PA         Thoracic pistol          Left flexion rotation mobilization        Neuro Re-Ed        LTRs        REIL 3x10 belt OP 3x10  3x10  2x10 3x10   REIL HOC         Bridge on ball        Bridges        Single leg bridge 2x10 B  3x10 B 3x10 B  3x10 B  2x10 B   S/L TrA Brace        Seated [place] ROT        Seated jimmie RDL        Paloff Press        Big stick walkout 12# 10x B  12# 2x10      D1 lift   8# 10x B  8# 10x B  8# 10x B    Squat with kb    2x10 20# kb chair tap 2x10 20# kb chair tap 3x10 20# kb chair tap   Front Carry        Sciatic nerve glide        Rack pull 95# 2x10  95# 2x10 95# 3x10 95# 3x10 105# 2x10    Lunges    2 laps 30 ft 2 laps 30 ft 2 laps 30 ft   Single leg RDL     2x10 B c cone   Bird dog Rtb 2x10 B Rtb 2x10 B  Gtb 2x10 B  Gtb 2x10 B Btb 2x10 B   Leg press   115# 10x  135# 2x10 125# 10x  135# 2x10    Ther Ex        HEP reveiw        NS  8' L1 8' L1 8' L1 8' L1 8' L1   PNE        TS Ext foam roll        Open book                        Pt edu CS assess and POC       Ther Activity                        Gait Training                        Modalities

## 2025-03-05 ENCOUNTER — OFFICE VISIT (OUTPATIENT)
Age: 40
End: 2025-03-05
Payer: COMMERCIAL

## 2025-03-05 DIAGNOSIS — M51.26 OTHER INTERVERTEBRAL DISC DISPLACEMENT, LUMBAR REGION: ICD-10-CM

## 2025-03-05 DIAGNOSIS — M54.42 ACUTE MIDLINE LOW BACK PAIN WITH BILATERAL SCIATICA: ICD-10-CM

## 2025-03-05 DIAGNOSIS — M54.16 LUMBAR RADICULOPATHY: Primary | ICD-10-CM

## 2025-03-05 DIAGNOSIS — M54.41 ACUTE MIDLINE LOW BACK PAIN WITH BILATERAL SCIATICA: ICD-10-CM

## 2025-03-05 PROCEDURE — 97110 THERAPEUTIC EXERCISES: CPT

## 2025-03-05 PROCEDURE — 97112 NEUROMUSCULAR REEDUCATION: CPT

## 2025-03-05 PROCEDURE — 97140 MANUAL THERAPY 1/> REGIONS: CPT

## 2025-03-05 NOTE — PROGRESS NOTES
Daily Note    Today's date: 3/5/2025  Patient name: Wellington Hollis  : 1985  MRN: [id number]  Referring provider: Beverly Flanagan PA-C  Dx:   Encounter Diagnosis     ICD-10-CM    1. Lumbar radiculopathy  M54.16       2. Other intervertebral disc displacement, lumbar region  M51.26       3. Acute midline low back pain with bilateral sciatica  M54.42     M54.41                        Subjective: Patient reports overall feel no pain but some stiffness in morning, improves throughout the day and with exercises. Is agreeable to d/c at this time and will call with any questions moving forward.        Objective: See treatment diary below.         Assessment:  Wellington Hollis was provided with updated HEP. He demonstrates significant improvement in lumbar ROM, strength, and overall tolerance to activity. The stiffness he does experience in the morning, he's able to manage with HEP. He has return to all functional activities without  being limited to pain, including walking [place] creek while fishing and playing with son. At this time, patient is appropriate and agreeable to discharge. Patient provided with updated HEP and advised to call with any questions/concerns, verbalizes understanding.       Plan: Discharge Poc.       POC End Date: 3/12/25       Precautions: C3-7 fusion (),     Visit/Unit Tracking  AUTH Status:  Date 2/7 2/12 2/14 2/19 2/26 3/5       12v Used 21 22 23 24 23 24        Remaining                    Pertinent Findings:      POC End Date: 25                                                                                       Test / Measure  11/20 1/7 2/3 3/5   FOTO (Predicted 67) 53 52 60 83   Lumbar flexion ROM 60p 75% 100% 100%   Lumbar Ext  25% 75% 85%   Thoracic Ext  Poor No limitation WNL   Core mmt 3/5 4-/5 4+/5 4+/5   Repeated motions  Hinge at L3 No hinge    LLTT   (L) (+) Tibial bias               Treatment Based Classification: Primary Mobility Secondary CBT    Manuals   2/19 2/26 3/5   Neutral Gap G5  CS Gr III-IV B    CS gr V B   Lumbar PA          Thoracic pistol        GR V CS   Left flexion rotation mobilization         Neuro Re-Ed         LTRs         REIL 3x10 belt OP 3x10  3x10  2x10 3x10    REIL HOC          Bridge on ball         Bridges         Single leg bridge 2x10 B  3x10 B 3x10 B  3x10 B  2x10 B 2x15 B   S/L TrA Brace         Seated [place] ROT         Seated herbert RDL         Paloff Press         Big stick walkout 12# 10x B  12# 2x10       D1 lift   8# 10x B  8# 10x B  8# 10x B  8# 2x10 B    Squat with kb    2x10 20# kb chair tap 2x10 20# kb chair tap 3x10 20# kb chair tap 3x10 20# kb    Front Carry         Sciatic nerve glide         Rack pull 95# 2x10  95# 2x10 95# 3x10 95# 3x10 105# 2x10     Lunges    2 laps 30 ft 2 laps 30 ft 2 laps 30 ft    Single leg RDL     2x10 B c cone 2x10 B c cone   Bird dog Rtb 2x10 B Rtb 2x10 B  Gtb 2x10 B  Gtb 2x10 B Btb 2x10 B Gtb 2x15 B    Leg press   115# 10x  135# 2x10 125# 10x  135# 2x10  15# 3x10    Ther Ex         HEP reveiw         NS  8' L1 8' L1 8' L1 8' L1 8' L1 8' L1   PNE         TS Ext foam roll         Open book                           Pt edu CS assess and POC        Ther Activity                           Gait Training                           Modalities

## 2025-03-11 ENCOUNTER — TELEMEDICINE (OUTPATIENT)
Dept: PAIN MEDICINE | Facility: CLINIC | Age: 40
End: 2025-03-11
Payer: COMMERCIAL

## 2025-03-11 DIAGNOSIS — M54.16 LUMBAR RADICULOPATHY: Primary | ICD-10-CM

## 2025-03-11 PROCEDURE — 99213 OFFICE O/P EST LOW 20 MIN: CPT | Performed by: ANESTHESIOLOGY

## 2025-03-11 NOTE — PROGRESS NOTES
VIRTUAL VISIT; PATIENT IDENTIFIED BY NAME AND DATE OF BIRTH; PHYSICAL EXAM NOT PERFORMED SECONDARY TO VIRTUAL VISIT; EXAM FINDINGS BELOW FROM PRIOR ENCOUNTER. A TOTAL OF 20 MINUTES VIA TELEPHONE WERE SPENT WITH PATIENT DISCUSSING INTERVENTIONS, CARE PLAN AND COORDINATION OF FUTURE CARE    Assessment  1. Lumbar radiculopathy      Patient presenting with chronic back and radicular leg pain for greater than 1 year, worsening over the past several months.    Pain is consistent with lumbar radicular pain accompanied by pain >7/10 on the pain scale with inability to participate in IADLs for >6 weeks. Patient has participated with physical therapy as well as home exercises and stretches.  Patient has tried numerous medication classes in the past with varying benefit- he wishes to avoid oral medications if possible.  Denies any bowel or bladder incontinence, saddle anesthesia.    In regards to the patient's  pathology, we discussed the various treatment options including physical therapy, medication management, activity modifications, interventional spine procedures.  Given that patient has not had any benefit with conservative treatments, I think patient would benefit from targeted interventional treatment modalities.    Independently reviewed and interpreted lumbar MRI - this shows a right paramedian disc herniation at L2-3 with severe right lateral recess stenosis and moderate central stenosis, as well as a disc herniation centrally at L4-5 with moderate central stenosis.    After recent bilateral L5 TF ESIs - he notes ongoing relief of about 90% with improved function. He can participate more in his ADLs and with PT.    Plan:    90% improvement after TF ESIs - discussed and recommended repeat bilateral L5 TF ESIs when pain returns to a significant degree. Patient agreeable.    My impressions and treatment recommendations were discussed in detail with the patient who verbalized understanding and had no further questions.   Discharge instructions were provided. I personally saw and examined the patient and I agree with the above discussed plan of care.    No orders of the defined types were placed in this encounter.    No orders of the defined types were placed in this encounter.      History of Present Illness    Wellington Hollis is a 39 y.o. male presenting for consultation at [facility] for exam and evaluation of chronic back and radicular leg pain for greater than 1 year, worsening over the past several months. Pain started without any precipitating injury or trauma. Over the past month, the intensity of pain has been Moderate to severe. Pain is currently 7-8/10. Pain does interfere with age appropriate activities of daily living. Pain is nearly constant, with no typical pattern throughout the day. Pain is described as shooting, sharp, pressure-like with pins/needles. Patient endorses weakness in the lower extremities. Assistance device used: None.    Worsening factors noted: sitting, walking, exercise.   Improving factors noted: none noted.    Treatments tried:   PT: yes  Chiropractic therapy: no  Injections: yes, lumbar 1x and several cervical   Previous spine surgery: yes, ACDF    Anticoagulation: no    Medications tried:   Tylenol, NSAIDs, lidocaine topical, muscle relaxants    I have personally reviewed and/or updated the patient's past medical history, past surgical history, family history, social history, current medications, allergies, and vital signs today.     Review of Systems   Constitutional:  Negative for chills and fever.   HENT:  Negative for ear pain and sore throat.    Eyes:  Negative for pain and visual disturbance.   Respiratory:  Negative for cough and shortness of breath.    Cardiovascular:  Negative for chest pain and palpitations.   Gastrointestinal:  Negative for abdominal pain and vomiting.   Genitourinary:  Negative for dysuria and hematuria.   Musculoskeletal:  Positive for  "arthralgias, back pain, gait problem and myalgias.   Skin:  Negative for color change and rash.   Neurological:  Positive for weakness. Negative for seizures and syncope.   All other systems reviewed and are negative.      Patient Active Problem List   Diagnosis    Lumbar radiculopathy       Past Medical History:   Diagnosis Date    Cervical disc disorder 2016    Degenerative Disc disease    Low back pain 2016    Pain    Lumbosacral disc disease 2018    Pain    Thoracic disc disorder 2018    Pain       No past surgical history on file.    Family History   Problem Relation Age of Onset    Stroke Mother         Stroke which took her vidion in left eye. Possibly know cause, will discuss in person       Social History     Occupational History    Not on file   Tobacco Use    Smoking status: Every Day     Current packs/day: 1.00     Average packs/day: 1.1 packs/day for 27.3 years (30.3 ttl pk-yrs)     Types: Cigarettes     Start date: 1/1/2004    Smokeless tobacco: Never    Tobacco comments:     Want to quit, but struggle with it. Did quit for a year in 2017   Substance and Sexual Activity    Alcohol use: Yes     Alcohol/week: 3.0 standard drinks of alcohol     Comment: Maybe a couple drinks a month, not much at all    Drug use: Yes     Frequency: 7.0 times per week     Types: Marijuana     Comment: Only a little at night before bed.    Sexual activity: Yes     Partners: Female     Comment: Wife has an insert birth control       Current Outpatient Medications on File Prior to Visit   Medication Sig    aspirin (ECOTRIN LOW STRENGTH) 81 mg EC tablet  (Patient not taking: Reported on 1/17/2025)    raNITIdine HCl (ZANTAC 75 PO)  (Patient not taking: Reported on 1/17/2025)     No current facility-administered medications on file prior to visit.       Allergies   Allergen Reactions    Methocarbamol Other (See Comments)     \"MS symptoms\" muscle weakness, numbness, and tingling.       Physical Exam    There were no vitals taken " for this visit.    Constitutional: normal, well developed, well nourished, alert, in no distress and non-toxic and no overt pain behavior.  Eyes: anicteric  HEENT: grossly intact  Neck: supple, symmetric, trachea midline and no masses   Pulmonary:even and unlabored  Cardiovascular:No edema or pitting edema present  Skin:Normal without rashes or lesions and well hydrated  Psychiatric:Mood and affect appropriate  Neurologic: Motor function is grossly intact with no focal neurologic deficits   Musculoskeletal: nonantalgic gait, discomfort with lumbar spine ROM    Imaging  MRI LUMBAR SPINE WITHOUT CONTRAST     INDICATION: M54.16: Radiculopathy, lumbar region  M54.42: Lumbago with sciatica, left side  M54.41: Lumbago with sciatica, right side  M51.26: Other intervertebral disc displacement, lumbar region.   Acute midline low back pain with bilateral sciatica.     COMPARISON: Outside MRI March 25, 2018     TECHNIQUE:  Multiplanar, multisequence imaging of the lumbar spine was performed. .        IMAGE QUALITY:  Diagnostic     FINDINGS:     VERTEBRAL BODIES:  There are 5 lumbar type vertebral bodies.  Normal alignment of the lumbar spine.  No spondylolysis or spondylolisthesis. No scoliosis.  No compression fracture.    Normal marrow signal is identified within the visualized bony   structures.  No discrete marrow lesion.     SACRUM:  Normal signal within the sacrum. No evidence of insufficiency or stress fracture.     DISTAL CORD AND CONUS:  Normal size and signal within the distal cord and conus.     PARASPINAL SOFT TISSUES:  Paraspinal soft tissues are unremarkable.     LOWER THORACIC DISC SPACES:  Normal disc height and signal.  No disc herniation, canal stenosis or foraminal narrowing.     LUMBAR DISC SPACES:     L1-L2:  Normal.     L2-L3: Mild diffuse annular bulge with a superimposed moderate-sized right paramedian protrusion type disc herniation contributes to moderate central stenosis and severe right lateral  recess stenosis. Correlate for right L3 radiculopathy. Nerve roots   above this level are stretched and redundant secondary to the degree of spinal stenosis. Protrusion type disc herniation has increased in size with progressive central stenosis when compared to the prior study     L3-L4: Mild annular bulge with small marginal osteophytes and mild facet hypertrophy combined to result in mild central and mild left lateral recess stenosis. Minimal foraminal narrowing bilaterally.     L4-L5: Diffuse annular bulge with superimposed moderate size central protrusion type disc herniation contributes to moderate central and left lateral recess stenosis with mild right lateral recess stenosis. Marginal osteophytes contribute to mild   bilateral foraminal narrowing. Central stenosis has progressed from the prior study with slight interval increase size of central protrusion.     L5-S1: No significant central or foraminal narrowing with minimal anterolisthesis noted stable from prior study.     OTHER FINDINGS:  None.     IMPRESSION:     Progressive stenosis secondary to increased size of protrusion type disc herniations at L2-3 and L4-5 when compared to the prior study. Moderate central and left lateral recess stenosis noted at L4-5 with moderate central and severe right lateral recess   stenosis at L2-3. Correlate for right L3 radiculopathy. Nerve roots above the L2-3 level are stretched and redundant secondary to the degree of spinal stenosis which is a new finding compared to the prior study.